# Patient Record
Sex: FEMALE | Race: WHITE | NOT HISPANIC OR LATINO
[De-identification: names, ages, dates, MRNs, and addresses within clinical notes are randomized per-mention and may not be internally consistent; named-entity substitution may affect disease eponyms.]

---

## 2019-11-14 ENCOUNTER — APPOINTMENT (OUTPATIENT)
Dept: MAMMOGRAPHY | Facility: CLINIC | Age: 74
End: 2019-11-14
Payer: MEDICARE

## 2019-11-14 ENCOUNTER — OUTPATIENT (OUTPATIENT)
Dept: OUTPATIENT SERVICES | Facility: HOSPITAL | Age: 74
LOS: 1 days | End: 2019-11-14
Payer: MEDICARE

## 2019-11-14 DIAGNOSIS — Z00.8 ENCOUNTER FOR OTHER GENERAL EXAMINATION: ICD-10-CM

## 2019-11-14 PROCEDURE — 77063 BREAST TOMOSYNTHESIS BI: CPT | Mod: 26

## 2019-11-14 PROCEDURE — 77067 SCR MAMMO BI INCL CAD: CPT

## 2019-11-14 PROCEDURE — 77063 BREAST TOMOSYNTHESIS BI: CPT

## 2019-11-14 PROCEDURE — 77067 SCR MAMMO BI INCL CAD: CPT | Mod: 26

## 2021-09-27 ENCOUNTER — APPOINTMENT (OUTPATIENT)
Dept: VASCULAR SURGERY | Facility: CLINIC | Age: 76
End: 2021-09-27
Payer: MEDICARE

## 2021-09-27 VITALS — WEIGHT: 200 LBS | TEMPERATURE: 97.1 F | BODY MASS INDEX: 35.44 KG/M2 | HEIGHT: 63 IN

## 2021-09-27 VITALS — HEART RATE: 67 BPM | SYSTOLIC BLOOD PRESSURE: 120 MMHG | DIASTOLIC BLOOD PRESSURE: 75 MMHG

## 2021-09-27 PROCEDURE — 99203 OFFICE O/P NEW LOW 30 MIN: CPT

## 2021-09-27 PROCEDURE — 93931 UPPER EXTREMITY STUDY: CPT

## 2021-09-27 NOTE — PHYSICAL EXAM
[de-identified] : On physical examination the patient is in no acute distress and neurologically intact. The lungs are clear to auscultation and the heart has a regular rate and rhythm. Abdomen is benign. Right brachial and radial pulses are non-palpable. Left sided pulses are palpable.

## 2021-09-27 NOTE — ASSESSMENT
[FreeTextEntry1] : In summary, Mrs. Fonsecaca p/w incidental finding of innominate artery lesion. We will check her renal function and obtain a CTA to better assess the lesion. A right upper extremity arterial duplex performed in the office today was consistent with innominate lesion and decreased flow throughout right upper extremity. I instructed her to take Aspirin 81mg daily.

## 2021-09-27 NOTE — HISTORY OF PRESENT ILLNESS
[FreeTextEntry1] : I have just had the pleasure of seeing Mrs. 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 \par \par Mrs. Rosario is a pleasant 76-year-old lady who reported a ringing sensation in her ears. As port of her workup, a carotid duplex was performed which was concerning for a lesion proximal to the right carotid artery (no other significant findings were noted). She underwent MRA which showed innominate stenosis vs. artifact and she was referred for additional evaluation. She has a history of right shoulder injury and right wrist fracture necessitating surgery. Her range of motion is limited due to these prior injuries and she denies any symptoms consistent with claudication or rest pain. She reports that her blood pressure is typically measured on the left side. She denies any paresthesia, weakness or skin discoloration of the right upper extremity. She denies any history of TIA or CVA and denies any focal neurologic deficits including amaurosis fugax, facial droop, difficulty with speech or extremity weakness (except as described). She denies any history of CAD, MI, arrhythmia, DM or CRI.\par \par Her medical history is significant for HTN, HLD, appendectomy, bladder cancer s/p BCG\par \par Medications: Enalapril, Prilosec, and Zetia\par \par Allergies: NKDA\par \par FH: NC\par \par SH: Smoked since teenage years. Quit 13 years ago. Smoked 1ppd at most.\par \par

## 2021-09-30 ENCOUNTER — APPOINTMENT (OUTPATIENT)
Dept: CT IMAGING | Facility: CLINIC | Age: 76
End: 2021-09-30
Payer: MEDICARE

## 2021-09-30 ENCOUNTER — OUTPATIENT (OUTPATIENT)
Dept: OUTPATIENT SERVICES | Facility: HOSPITAL | Age: 76
LOS: 1 days | End: 2021-09-30
Payer: MEDICARE

## 2021-09-30 ENCOUNTER — RESULT REVIEW (OUTPATIENT)
Age: 76
End: 2021-09-30

## 2021-09-30 DIAGNOSIS — Z00.8 ENCOUNTER FOR OTHER GENERAL EXAMINATION: ICD-10-CM

## 2021-09-30 PROCEDURE — 70498 CT ANGIOGRAPHY NECK: CPT | Mod: 26,MH

## 2021-09-30 PROCEDURE — 71275 CT ANGIOGRAPHY CHEST: CPT | Mod: 26,MH

## 2021-09-30 PROCEDURE — 70498 CT ANGIOGRAPHY NECK: CPT | Mod: MH

## 2021-09-30 PROCEDURE — 71275 CT ANGIOGRAPHY CHEST: CPT | Mod: MH

## 2021-10-07 ENCOUNTER — APPOINTMENT (OUTPATIENT)
Dept: VASCULAR SURGERY | Facility: CLINIC | Age: 76
End: 2021-10-07
Payer: MEDICARE

## 2021-10-07 PROCEDURE — 99212 OFFICE O/P EST SF 10 MIN: CPT

## 2021-10-07 NOTE — ASSESSMENT
[FreeTextEntry1] : As part of her evaluation, Mrs. Rosario underwent CTA of the chest and neck. This showed a high grade stenosis with heavy calcification of the innominate artery. Additionally, findings were as follows: intracerebral aneurysms (7.4mm left anterior communicating artery and 2.7mm left MCA bifurcation); izabel-pancreatic fat stranding (partially imaged) suspicious for pancreatitis.\par \par The above results were discussed with  and Mr. Rosario. \par \par For her innominate stenosis, we will continue conservative management. She will follow up in six months. Aspirin 81mg should be started following neurosurgical evaluation. She should be placed on a statin if there is no contraindication.\par \par I have referred her to Dr. Jer Ortiz for Neurosurgical evaluation. \par \par I have sent over the CT findings reports to her PCP to follow up on pancreatitis.

## 2021-10-07 NOTE — REASON FOR VISIT
[Home] : at home, [unfilled] , at the time of the visit. [Medical Office: ([facility])___] : at the medical office located in  [Verbal consent obtained from patient] : the patient, [unfilled]

## 2022-04-07 ENCOUNTER — APPOINTMENT (OUTPATIENT)
Dept: NEUROSURGERY | Facility: CLINIC | Age: 77
End: 2022-04-07
Payer: MEDICARE

## 2022-04-07 DIAGNOSIS — Z86.39 PERSONAL HISTORY OF OTHER ENDOCRINE, NUTRITIONAL AND METABOLIC DISEASE: ICD-10-CM

## 2022-04-07 DIAGNOSIS — Z86.79 PERSONAL HISTORY OF OTHER DISEASES OF THE CIRCULATORY SYSTEM: ICD-10-CM

## 2022-04-07 PROCEDURE — 99203 OFFICE O/P NEW LOW 30 MIN: CPT

## 2022-04-08 NOTE — HISTORY OF PRESENT ILLNESS
[de-identified] : Yoseph Rosario is a 76yr old female here for a new patient visit. 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 Gage via endovascular coiling acomm aneurysm.  Today she presents feeling well denies any motor sensory speech visual abnormalities. Had recent MRA Brain done at [facility] which shed like to review with us for second opinion. Has history  of right side ear whooshing which prompted imaging with CTA and ultrasound  brachiocephalic blockages. Mother has history of cerebral aneurysm which ruptured when she was 80. \par \par history of bladder cancer treated by Dr. Tucker

## 2022-04-08 NOTE — ASSESSMENT
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 Salas via endovascular coiling acomm aneurysm. \par  Had recent MRA Brain done at [facility] showing flow related enhancement in regio of previously treated acomm aneurysm \par Plan:\par Recommend she mail or bring in CDs of angiogram done at [facility] then will discuss plan of care moving forward

## 2022-06-30 ENCOUNTER — APPOINTMENT (OUTPATIENT)
Dept: NEUROSURGERY | Facility: CLINIC | Age: 77
End: 2022-06-30

## 2022-06-30 VITALS
HEIGHT: 63 IN | SYSTOLIC BLOOD PRESSURE: 134 MMHG | DIASTOLIC BLOOD PRESSURE: 77 MMHG | BODY MASS INDEX: 35.44 KG/M2 | WEIGHT: 200 LBS | TEMPERATURE: 97.3 F | OXYGEN SATURATION: 96 % | HEART RATE: 75 BPM

## 2022-06-30 PROCEDURE — 99215 OFFICE O/P EST HI 40 MIN: CPT

## 2022-06-30 NOTE — PHYSICAL EXAM
[General Appearance - Alert] : alert [General Appearance - In No Acute Distress] : in no acute distress [Person] : oriented to person [Place] : oriented to place [Time] : oriented to time [Motor Strength] : muscle strength was normal in all four extremities

## 2022-07-01 NOTE — ASSESSMENT
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 Salas via endovascular coiling acomm aneurysm. 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 The risks, benefits, alternative, complications and personnel associated with the procedure were discussed with the patient and family in great detail.  They request that we proceed. 8/30/2022\par

## 2022-07-01 NOTE — HISTORY OF PRESENT ILLNESS
[FreeTextEntry1] : Yoseph Rosario is a 76yr old female here for a new patient visit. 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 Gage via endovascular coiling acomm aneurysm. Today she presents feeling well denies any motor sensory speech visual abnormalities. Had recent MRA Brain done at [facility] which shed like to review with us for second opinion. Has history of right side ear whooshing which prompted imaging with CTA and ultrasound brachiocephalic blockages. Mother has history of cerebral aneurysm which ruptured when she was 80. \par \par history of bladder cancer treated by Dr. Tucker \par

## 2022-07-01 NOTE — REASON FOR VISIT
[Follow-Up: _____] : a [unfilled] follow-up visit [Family Member] : family member [Spouse] : spouse [FreeTextEntry1] : Zuleyma Rosario presents today with CD of cerebral angiogram done at [facility] for us to review. Today she feels well denies any motor sensory speech visual abnormalities. Brother had aneurysm repaired, mother had aneurysm rupture. States he has right brachiocephalic issues, no blood pressure in right arm weak to no pulse in right wrist. States Dr. Gage treated the aneurysm through left groin.

## 2022-07-06 ENCOUNTER — APPOINTMENT (OUTPATIENT)
Dept: ORTHOPEDIC SURGERY | Facility: CLINIC | Age: 77
End: 2022-07-06

## 2022-07-06 VITALS — BODY MASS INDEX: 35.44 KG/M2 | WEIGHT: 200 LBS | HEIGHT: 63 IN

## 2022-07-06 DIAGNOSIS — M23.92 UNSPECIFIED INTERNAL DERANGEMENT OF LEFT KNEE: ICD-10-CM

## 2022-07-06 DIAGNOSIS — S83.282A OTHER TEAR OF LATERAL MENISCUS, CURRENT INJURY, LEFT KNEE, INITIAL ENCOUNTER: ICD-10-CM

## 2022-07-06 DIAGNOSIS — M17.12 UNILATERAL PRIMARY OSTEOARTHRITIS, LEFT KNEE: ICD-10-CM

## 2022-07-06 DIAGNOSIS — S83.242A OTHER TEAR OF MEDIAL MENISCUS, CURRENT INJURY, LEFT KNEE, INITIAL ENCOUNTER: ICD-10-CM

## 2022-07-06 PROCEDURE — J3490M: CUSTOM

## 2022-07-06 PROCEDURE — 99214 OFFICE O/P EST MOD 30 MIN: CPT | Mod: 25

## 2022-07-06 PROCEDURE — 20610 DRAIN/INJ JOINT/BURSA W/O US: CPT

## 2022-07-06 NOTE — PHYSICAL EXAM
[Left] : left knee [NL (0)] : extension 0 degrees [5___] : hamstring 5[unfilled]/5 [Positive] : positive Vicki [] : patient ambulates without assistive device [TWNoteComboBox7] : flexion 120 degrees

## 2022-07-06 NOTE — PROCEDURE
[FreeTextEntry3] : Procedure Name: Large Joint Injection / Aspiration: Celestone, Lidocaine and Marcaine \par \par Large Joint Injection was performed because of pain and inflammation. Anesthesia: ethyl chloride sprayed topically.. \par Celestone: An injection of Celestone 6 mg , 1 cc. \par Lidocaine 1%: 3 cc.\par Marcaine 0.25%:  3 cc.\par \par Patient has tried OTC's including aspirin, Ibuprofen, Aleve etc or prescription NSAIDS, and/or exercises at home and/ or physical therapy without satisfactory response and Patient has decreased mobility in the joint. The risks, benefits, and alternatives to cortisone injection were explained in full to the patient. Risks outlined include but are not limited to infection, sepsis, bleeding, scarring, skin discoloration, temporary increase in pain, syncopal episode, failure to resolve symptoms, allergic reaction, symptom recurrence, and elevation of blood sugar in diabetics. Patient understood the risks. All questions were answered.   Oral informed consent was obtained.   Sterile technique was utilized for the procedure including the preparation of the solutions used for the injection. Medication was injected in the LEFT KNEE.   Patient tolerated the procedure well. Advised to ice the injection site this evening.  Post Procedure Instructions: Patient was advised to call if redness, pain, or fever occur and apply ice for 15 min. out of every hour for the next 12-24 hours as tolerated. patient was advised to rest the joint(s) for 2 days. \par \par

## 2022-07-06 NOTE — ASSESSMENT
[FreeTextEntry1] : left knee pain for years but worse since end of june 2022.  no new injury or trauma.  no fevers or chills.  mri 2022 with mmt, lmt, moderate to severe oa. \par \par history of brain aneurysm.

## 2022-07-06 NOTE — HISTORY OF PRESENT ILLNESS
[6] : 6 [4] : 4 [de-identified] : 7/6/22:  left knee pain for years but worse since end of june 2022.  no new injury or trauma.  pain worse with walking, stairs, bending.  reports to tightness and stiffness.  occ clicking.  no buckling.  rest, ice, otc meds prn with little relief.  she went to an  and then an mri was ordered.  she is here with results.  [] : no [FreeTextEntry1] : left knee [FreeTextEntry5] : No known injury. [FreeTextEntry6] : pulling [de-identified] : mri @ [facility]

## 2022-07-06 NOTE — DISCUSSION/SUMMARY
[de-identified] : Instructions:  Progress Note completed by Lisa Poe PA-C\par * Dr. Back -- The documentation recorded accurately reflects the decisions made by me during this visit.

## 2022-07-06 NOTE — DATA REVIEWED
[MRI] : MRI [Left] : left [Knee] : knee [Report was reviewed and noted in the chart] : The report was reviewed and noted in the chart [I reviewed the films/CD] : I reviewed the films/CD

## 2022-08-25 ENCOUNTER — OUTPATIENT (OUTPATIENT)
Dept: OUTPATIENT SERVICES | Facility: HOSPITAL | Age: 77
LOS: 1 days | End: 2022-08-25
Payer: MEDICARE

## 2022-08-25 VITALS
SYSTOLIC BLOOD PRESSURE: 180 MMHG | TEMPERATURE: 99 F | DIASTOLIC BLOOD PRESSURE: 90 MMHG | OXYGEN SATURATION: 97 % | RESPIRATION RATE: 18 BRPM | HEIGHT: 61 IN | WEIGHT: 197.09 LBS | HEART RATE: 100 BPM

## 2022-08-25 DIAGNOSIS — C67.9 MALIGNANT NEOPLASM OF BLADDER, UNSPECIFIED: Chronic | ICD-10-CM

## 2022-08-25 DIAGNOSIS — Z98.890 OTHER SPECIFIED POSTPROCEDURAL STATES: Chronic | ICD-10-CM

## 2022-08-25 DIAGNOSIS — I67.1 CEREBRAL ANEURYSM, NONRUPTURED: ICD-10-CM

## 2022-08-25 DIAGNOSIS — Z01.818 ENCOUNTER FOR OTHER PREPROCEDURAL EXAMINATION: ICD-10-CM

## 2022-08-25 LAB
ANION GAP SERPL CALC-SCNC: 12 MMOL/L — SIGNIFICANT CHANGE UP (ref 5–17)
BLD GP AB SCN SERPL QL: NEGATIVE — SIGNIFICANT CHANGE UP
BUN SERPL-MCNC: 17 MG/DL — SIGNIFICANT CHANGE UP (ref 7–23)
CALCIUM SERPL-MCNC: 9.8 MG/DL — SIGNIFICANT CHANGE UP (ref 8.4–10.5)
CHLORIDE SERPL-SCNC: 104 MMOL/L — SIGNIFICANT CHANGE UP (ref 96–108)
CO2 SERPL-SCNC: 23 MMOL/L — SIGNIFICANT CHANGE UP (ref 22–31)
CREAT SERPL-MCNC: 0.81 MG/DL — SIGNIFICANT CHANGE UP (ref 0.5–1.3)
EGFR: 75 ML/MIN/1.73M2 — SIGNIFICANT CHANGE UP
GLUCOSE SERPL-MCNC: 106 MG/DL — HIGH (ref 70–99)
HCT VFR BLD CALC: 39.4 % — SIGNIFICANT CHANGE UP (ref 34.5–45)
HGB BLD-MCNC: 12.7 G/DL — SIGNIFICANT CHANGE UP (ref 11.5–15.5)
MCHC RBC-ENTMCNC: 29.5 PG — SIGNIFICANT CHANGE UP (ref 27–34)
MCHC RBC-ENTMCNC: 32.2 GM/DL — SIGNIFICANT CHANGE UP (ref 32–36)
MCV RBC AUTO: 91.4 FL — SIGNIFICANT CHANGE UP (ref 80–100)
NRBC # BLD: 0 /100 WBCS — SIGNIFICANT CHANGE UP (ref 0–0)
PLATELET # BLD AUTO: 264 K/UL — SIGNIFICANT CHANGE UP (ref 150–400)
POTASSIUM SERPL-MCNC: 4 MMOL/L — SIGNIFICANT CHANGE UP (ref 3.5–5.3)
POTASSIUM SERPL-SCNC: 4 MMOL/L — SIGNIFICANT CHANGE UP (ref 3.5–5.3)
RBC # BLD: 4.31 M/UL — SIGNIFICANT CHANGE UP (ref 3.8–5.2)
RBC # FLD: 13.2 % — SIGNIFICANT CHANGE UP (ref 10.3–14.5)
RH IG SCN BLD-IMP: NEGATIVE — SIGNIFICANT CHANGE UP
SODIUM SERPL-SCNC: 139 MMOL/L — SIGNIFICANT CHANGE UP (ref 135–145)
WBC # BLD: 9.06 K/UL — SIGNIFICANT CHANGE UP (ref 3.8–10.5)
WBC # FLD AUTO: 9.06 K/UL — SIGNIFICANT CHANGE UP (ref 3.8–10.5)

## 2022-08-25 PROCEDURE — 86901 BLOOD TYPING SEROLOGIC RH(D): CPT

## 2022-08-25 PROCEDURE — 86850 RBC ANTIBODY SCREEN: CPT

## 2022-08-25 PROCEDURE — 80048 BASIC METABOLIC PNL TOTAL CA: CPT

## 2022-08-25 PROCEDURE — G0463: CPT

## 2022-08-25 PROCEDURE — 86900 BLOOD TYPING SEROLOGIC ABO: CPT

## 2022-08-25 PROCEDURE — 85027 COMPLETE CBC AUTOMATED: CPT

## 2022-08-25 NOTE — H&P PST ADULT - NSICDXPASTMEDICALHX_GEN_ALL_CORE_FT
PAST MEDICAL HISTORY:  Bladder cancer     History of cerebral aneurysm     Hypertension     Stenosis of brachiocephalic artery

## 2022-08-25 NOTE — H&P PST ADULT - PROBLEM SELECTOR PLAN 1
Cerebral angiogram 8/30/22, patient stopped aspirin for 3 days.  Instructed patient to continue aspirin. Aspirin PLT response lab work not performed due to patient stopping for 3 days prior to PST.  Elevated BP discussed with anesthesia, as it is directly related to brachiocephalic stenosis.  Dr. Berg is aware, as per patient and plans to do intervention after cleared from any new cerebral aneurysms

## 2022-08-25 NOTE — H&P PST ADULT - HISTORY OF PRESENT ILLNESS
This is a 77 y/o female PMH tinnitus of the left ear a year ago, was found to have brachiocephalic stenosis and incidental finding of cerebral aneurysm, S/P coiling 12/03/21.  Presents today for follow up cerebral angiogram.    COVID+ with mild symptoms 2020, COVID swab scheduled 8/26/22 This is a 75 y/o female PMH tinnitus of the left ear a year ago, was found to have brachiocephalic stenosis and incidental finding of cerebral aneurysm, S/P coiling 12/03/21.  Presents today for follow up cerebral angiogram.  Of note, patient has had elevated blood pressure as a result of brachiocephalic stenosis.    COVID+ with mild symptoms 2020, COVID swab scheduled 8/26/22

## 2022-08-25 NOTE — H&P PST ADULT - MUSCULOSKELETAL
[facility]  Adult Nutrition  Follow-up Note         Reason for Assessment  Reason For Assessment: RD follow-up  Nutrition Risk Screen: tube feeding or parenteral nutrition, large or nonhealing wound, burn or pressure injury  Malnutrition  Is Patient Malnourished: No  Nutrition Diagnosis  Increased protein Needs   related to Decreased/ impaired skin integrity as evidenced by wounds    Nutrition Diagnosis Status: Improving      Nutrition Risk  Level of Risk/Frequency of Follow-up: high   Chewing or Swallowing Difficulty?: Swallowing difficulty  Estimated/Assessed Needs  RMR ([place]-St. Jeor Equation): 1527.13 Activity Factor: 1 Injury Factor: 1.2   Total Ve: 12 mL Temp: 99.2 °F (37.3 °C)Axillary  Weight Used For Calorie Calculations: 75.3 kg (166 lb 0.1 oz)   Energy Need Method: [facility] Energy Calorie Requirements (kcal): 2105  Weight Used For Protein Calculations: 83.3 kg (183 lb 10.3 oz)  Protein Requirements: 100-125  Estimated Fluid Requirement Method: RDA Method Fluid Requirements (mL): 2105  RDA Method (mL): 2105     Nutrition Prescription / Recommendations  Recommendation/Intervention: PEG tube feeding: Nepro @ 60ml/hr; H20 flush of 50ml q 4hr  Goals: TF tolerance, wound healing, pt will meet estimated nutritional needs  Nutrition Goal Status: progressing towards goal  Communication of RD Recs: reviewed with RN  Current Diet Order: PEG tube feeding: Nepro @ 60ml/hr; H20 flush of 50ml q 4hr  Nutrition Order Comments: PEG tube feeding: Nepro @ 60ml/hr; H20 flush of 100ml q 4hr  Current Nutrition Support Formula Ordered: Nepro  Current Nutrition Support Rate Ordered: 60 (ml)  Current Nutrition Support Frequency Ordered: hourly  Recommended Diet: Enteral Nutrition PEG tube feeding: Nepro @ 60ml/hr; H20 flush of 50ml q 4hr  Recommended Oral Supplement: No Oral Supplements  Is Nutrition Support Recommended: yes  Is Education Recommended: No  Monitor and Evaluation  % current Intake: Enteral  Nutrition at goal  % intake to meet estimated needs: Enteral Nutrition   Food and Nutrient Intake: enteral nutrition intake  Food and Nutrient Adminstration: enteral and parenteral nutrition administration  Anthropometric Measurements: height/length, body mass index, weight, weight change  Biochemical Data, Medical Tests and Procedures: electrolyte and renal panel, glucose/endocrine profile  Nutrition-Focused Physical Findings: skin  Enteral Calories (kcal): 2592  Enteral Protein (gm): 115  Enteral (Free Water) Fluid (mL): 1037  Free Water Flush Fluid (mL): 300  Parenteral Calories (kcal): 845  Parenteral Protein (gm): 48  Parenteral Fluid (mL): 960  Lipid Calories (kcals): 500 kcals  Other Calories (kcal): 528 (propofol)  Total Calories (kcal): 2160  Total Calories (kcal/kg): 2612  % Kcal Needs: 100  Total Protein (gm): 135  % Protein Needs: 100  IV Fluid (mL): 1764  Total Fluid Intake (mL): 1337  Energy Calories Required: meeting needs  Protein Required: meeting needs  Fluid Required: meeting needs  Tolerance: tolerating  Current Medical Diagnosis and Past Medical History  Diagnosis: gastrointestinal disease, infection/sepsis  Past Medical History:   Diagnosis Date    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  Last A1c: No results found for: HGBA1C  Lab Results   Component Value Date    RBC 2.52 (L) 04/08/2022    HGB 7.2 (L) 04/08/2022    HCT 22.8 (L) 04/08/2022    MCV 90.5 04/08/2022    MCH 28.6 04/08/2022    MCHC 31.6 (L) 04/08/2022     Pertinent Labs Reviewed: reviewed  Pertinent Labs Comments: Hct  "22.8, Na 133, BUN 97, BUN 97, Cr 2.59, Alb 1.3  Pertinent Medications Reviewed: reviewed  Pertinent Medications Comments: heparin, insulin  Anthropometrics  Temp: 99.2 °F (37.3 °C)  Height Method: Stated  Height: 5' 11" (180.3 cm)  Height (inches): 71 in  Weight Method: Bed Scale  Weight: 72.5 kg (159 lb 13.3 oz)  Weight (lb): 159.84 lb  Ideal Body Weight (IBW), Male: 172 lb  % Ideal Body Weight, Male (lb): 106.77 %  BMI (Calculated): 22.3  BMI Grade: 25 - 29.9 - overweight     Nutrition by Nursing  Diet/Nutrition Received: tube feeding  Intake (%): 100%  Diet/Feeding Assistance: total feed  Diet/Feeding Tolerance: good  Last Bowel Movement: 04/08/22  [REMOVED]      NG/OG Tube [facility] sump 18 Fr. Left nostril-Feeding Type: continuous, by pump       Gastrostomy/Enterostomy 03/09/22 1436 Percutaneous endoscopic gastrostomy (PEG) midline feeding-Feeding Type: continuous, by pump  [REMOVED]      NG/OG Tube [facility] sump 18 Fr. Left nostril-Current Rate (mL/hr): 50 mL/hr       Gastrostomy/Enterostomy 03/09/22 1436 Percutaneous endoscopic gastrostomy (PEG) midline feeding-Current Rate (mL/hr): 60 mL/hr  [REMOVED]      NG/OG Tube [facility] sump 18 Fr. Left nostril-Goal Rate (mL/hr): 75 mL/hr       Gastrostomy/Enterostomy 03/09/22 1436 Percutaneous endoscopic gastrostomy (PEG) midline feeding-Goal Rate (mL/hr): 120 mL/hr  [REMOVED]      NG/OG Tube [facility] sump 18 Fr. Left nostril-Formula Name: nepro 1.8       Gastrostomy/Enterostomy 03/09/22 1436 Percutaneous endoscopic gastrostomy (PEG) midline feeding-Formula Name: Nepro  Nutrition Follow-Up  RD Follow-up?: Yes  Assessment and Plan  No new Assessment & Plan notes have been filed under this hospital service since the last note was generated.  Service: Nutrition     " normal/ROM intact/normal gait/strength 5/5 bilateral upper extremities/strength 5/5 bilateral lower extremities negative

## 2022-08-25 NOTE — H&P PST ADULT - NSANTHOSAYNRD_GEN_A_CORE
No. DARCY screening performed.  STOP BANG Legend: 0-2 = LOW Risk; 3-4 = INTERMEDIATE Risk; 5-8 = HIGH Risk

## 2022-08-25 NOTE — H&P PST ADULT - NSICDXPASTSURGICALHX_GEN_ALL_CORE_FT
PAST SURGICAL HISTORY:  Bladder cancer S/P TURBT    H/O cerebral aneurysm repair coiling 12/2021    S/P ORIF (open reduction internal fixation) fracture

## 2022-08-26 ENCOUNTER — TRANSCRIPTION ENCOUNTER (OUTPATIENT)
Age: 77
End: 2022-08-26

## 2022-08-26 ENCOUNTER — OUTPATIENT (OUTPATIENT)
Dept: OUTPATIENT SERVICES | Facility: HOSPITAL | Age: 77
LOS: 1 days | End: 2022-08-26
Payer: MEDICARE

## 2022-08-26 DIAGNOSIS — C67.9 MALIGNANT NEOPLASM OF BLADDER, UNSPECIFIED: Chronic | ICD-10-CM

## 2022-08-26 DIAGNOSIS — Z11.52 ENCOUNTER FOR SCREENING FOR COVID-19: ICD-10-CM

## 2022-08-26 DIAGNOSIS — Z98.890 OTHER SPECIFIED POSTPROCEDURAL STATES: Chronic | ICD-10-CM

## 2022-08-26 LAB — SARS-COV-2 RNA SPEC QL NAA+PROBE: SIGNIFICANT CHANGE UP

## 2022-08-26 PROCEDURE — U0005: CPT

## 2022-08-26 PROCEDURE — C9803: CPT

## 2022-08-26 PROCEDURE — U0003: CPT

## 2022-08-30 ENCOUNTER — RESULT REVIEW (OUTPATIENT)
Age: 77
End: 2022-08-30

## 2022-08-30 ENCOUNTER — OUTPATIENT (OUTPATIENT)
Dept: OUTPATIENT SERVICES | Facility: HOSPITAL | Age: 77
LOS: 1 days | End: 2022-08-30
Payer: MEDICARE

## 2022-08-30 ENCOUNTER — TRANSCRIPTION ENCOUNTER (OUTPATIENT)
Age: 77
End: 2022-08-30

## 2022-08-30 ENCOUNTER — APPOINTMENT (OUTPATIENT)
Dept: NEUROSURGERY | Facility: HOSPITAL | Age: 77
End: 2022-08-30

## 2022-08-30 VITALS
RESPIRATION RATE: 18 BRPM | DIASTOLIC BLOOD PRESSURE: 90 MMHG | SYSTOLIC BLOOD PRESSURE: 173 MMHG | OXYGEN SATURATION: 95 % | HEART RATE: 70 BPM

## 2022-08-30 VITALS
DIASTOLIC BLOOD PRESSURE: 96 MMHG | SYSTOLIC BLOOD PRESSURE: 221 MMHG | HEIGHT: 62 IN | WEIGHT: 199.96 LBS | TEMPERATURE: 99 F | HEART RATE: 98 BPM | RESPIRATION RATE: 20 BRPM | OXYGEN SATURATION: 100 %

## 2022-08-30 DIAGNOSIS — Z98.890 OTHER SPECIFIED POSTPROCEDURAL STATES: Chronic | ICD-10-CM

## 2022-08-30 DIAGNOSIS — I67.1 CEREBRAL ANEURYSM, NONRUPTURED: ICD-10-CM

## 2022-08-30 DIAGNOSIS — C67.9 MALIGNANT NEOPLASM OF BLADDER, UNSPECIFIED: Chronic | ICD-10-CM

## 2022-08-30 PROCEDURE — C1894: CPT

## 2022-08-30 PROCEDURE — 36227 PLACE CATH XTRNL CAROTID: CPT | Mod: LT

## 2022-08-30 PROCEDURE — C1760: CPT

## 2022-08-30 PROCEDURE — 36224 PLACE CATH CAROTD ART: CPT | Mod: LT

## 2022-08-30 PROCEDURE — 36226 PLACE CATH VERTEBRAL ART: CPT | Mod: LT

## 2022-08-30 PROCEDURE — 36227 PLACE CATH XTRNL CAROTID: CPT

## 2022-08-30 PROCEDURE — 76377 3D RENDER W/INTRP POSTPROCES: CPT | Mod: 26

## 2022-08-30 PROCEDURE — 36224 PLACE CATH CAROTD ART: CPT

## 2022-08-30 PROCEDURE — C1769: CPT

## 2022-08-30 PROCEDURE — 36226 PLACE CATH VERTEBRAL ART: CPT

## 2022-08-30 PROCEDURE — 75630 X-RAY AORTA LEG ARTERIES: CPT | Mod: 26

## 2022-08-30 PROCEDURE — C1887: CPT

## 2022-08-30 RX ORDER — ONDANSETRON 8 MG/1
4 TABLET, FILM COATED ORAL ONCE
Refills: 0 | Status: DISCONTINUED | OUTPATIENT
Start: 2022-08-30 | End: 2022-09-13

## 2022-08-30 NOTE — PRE-ANESTHESIA EVALUATION ADULT - NSANTHPMHFT_GEN_ALL_CORE
75 y/o female PMH tinnitus of the left ear a year ago, was found to have brachiocephalic stenosis and incidental finding of cerebral aneurysm, S/P coiling 12/03/21.  Presents today for follow up cerebral angiogram.  Of note, patient has had elevated blood pressure as a result of brachiocephalic stenosis.      Patient with elevated BP today, however states BP is 140s-150s/80s in PCP and is always elevated prior to procedures.

## 2022-08-30 NOTE — ASU DISCHARGE PLAN (ADULT/PEDIATRIC) - NS MD DC FALL RISK RISK
For information on Fall & Injury Prevention, visit: [email or web address] OR  [email or web address] OR  https://www.cdc.gov/steadi/patient.html

## 2022-08-30 NOTE — ASU PATIENT PROFILE, ADULT - FALL HARM RISK - UNIVERSAL INTERVENTIONS
Bed in lowest position, wheels locked, appropriate side rails in place/Call bell, personal items and telephone in reach/Instruct patient to call for assistance before getting out of bed or chair/Non-slip footwear when patient is out of bed/[place] to call system/Physically safe environment - no spills, clutter or unnecessary equipment/Purposeful Proactive Rounding/Room/bathroom lighting operational, light cord in reach

## 2022-08-30 NOTE — ASU DISCHARGE PLAN (ADULT/PEDIATRIC) - ASU DC SPECIAL INSTRUCTIONSFT
cerebral angiogram completed confirmed right side subclavian steal   acomm aneurysm still filling  lmca and lica terminus aneurysm remain  call dr torres office at  to discuss treatment

## 2022-09-01 PROBLEM — Z86.79 PERSONAL HISTORY OF OTHER DISEASES OF THE CIRCULATORY SYSTEM: Chronic | Status: ACTIVE | Noted: 2022-08-25

## 2022-09-01 PROBLEM — I77.1 STRICTURE OF ARTERY: Chronic | Status: ACTIVE | Noted: 2022-08-25

## 2022-09-01 PROBLEM — I10 ESSENTIAL (PRIMARY) HYPERTENSION: Chronic | Status: ACTIVE | Noted: 2022-08-25

## 2022-09-01 PROBLEM — C67.9 MALIGNANT NEOPLASM OF BLADDER, UNSPECIFIED: Chronic | Status: ACTIVE | Noted: 2022-08-25

## 2022-09-14 ENCOUNTER — APPOINTMENT (OUTPATIENT)
Dept: NEUROSURGERY | Facility: CLINIC | Age: 77
End: 2022-09-14

## 2022-09-15 ENCOUNTER — APPOINTMENT (OUTPATIENT)
Dept: NEUROSURGERY | Facility: CLINIC | Age: 77
End: 2022-09-15

## 2022-09-20 ENCOUNTER — APPOINTMENT (OUTPATIENT)
Dept: NEUROSURGERY | Facility: CLINIC | Age: 77
End: 2022-09-20

## 2022-09-20 PROCEDURE — 99442: CPT | Mod: 95

## 2022-11-11 ENCOUNTER — OUTPATIENT (OUTPATIENT)
Dept: OUTPATIENT SERVICES | Facility: HOSPITAL | Age: 77
LOS: 1 days | End: 2022-11-11
Payer: MEDICARE

## 2022-11-11 ENCOUNTER — OUTPATIENT (OUTPATIENT)
Dept: OUTPATIENT SERVICES | Facility: HOSPITAL | Age: 77
LOS: 1 days | End: 2022-11-11

## 2022-11-11 VITALS
OXYGEN SATURATION: 98 % | WEIGHT: 199.96 LBS | HEART RATE: 83 BPM | SYSTOLIC BLOOD PRESSURE: 168 MMHG | DIASTOLIC BLOOD PRESSURE: 93 MMHG | TEMPERATURE: 97 F | RESPIRATION RATE: 15 BRPM | HEIGHT: 61 IN

## 2022-11-11 DIAGNOSIS — Z98.890 OTHER SPECIFIED POSTPROCEDURAL STATES: Chronic | ICD-10-CM

## 2022-11-11 DIAGNOSIS — Z11.52 ENCOUNTER FOR SCREENING FOR COVID-19: ICD-10-CM

## 2022-11-11 DIAGNOSIS — C67.9 MALIGNANT NEOPLASM OF BLADDER, UNSPECIFIED: Chronic | ICD-10-CM

## 2022-11-11 DIAGNOSIS — I67.1 CEREBRAL ANEURYSM, NONRUPTURED: ICD-10-CM

## 2022-11-11 DIAGNOSIS — Z01.818 ENCOUNTER FOR OTHER PREPROCEDURAL EXAMINATION: ICD-10-CM

## 2022-11-11 DIAGNOSIS — Z90.49 ACQUIRED ABSENCE OF OTHER SPECIFIED PARTS OF DIGESTIVE TRACT: Chronic | ICD-10-CM

## 2022-11-11 LAB
ANION GAP SERPL CALC-SCNC: 14 MMOL/L — SIGNIFICANT CHANGE UP (ref 5–17)
BLD GP AB SCN SERPL QL: NEGATIVE — SIGNIFICANT CHANGE UP
BUN SERPL-MCNC: 23 MG/DL — SIGNIFICANT CHANGE UP (ref 7–23)
CALCIUM SERPL-MCNC: 10 MG/DL — SIGNIFICANT CHANGE UP (ref 8.4–10.5)
CHLORIDE SERPL-SCNC: 103 MMOL/L — SIGNIFICANT CHANGE UP (ref 96–108)
CO2 SERPL-SCNC: 23 MMOL/L — SIGNIFICANT CHANGE UP (ref 22–31)
CREAT SERPL-MCNC: 0.98 MG/DL — SIGNIFICANT CHANGE UP (ref 0.5–1.3)
EGFR: 59 ML/MIN/1.73M2 — LOW
GLUCOSE SERPL-MCNC: 108 MG/DL — HIGH (ref 70–99)
HCT VFR BLD CALC: 38.9 % — SIGNIFICANT CHANGE UP (ref 34.5–45)
HGB BLD-MCNC: 12.6 G/DL — SIGNIFICANT CHANGE UP (ref 11.5–15.5)
MCHC RBC-ENTMCNC: 29.2 PG — SIGNIFICANT CHANGE UP (ref 27–34)
MCHC RBC-ENTMCNC: 32.4 GM/DL — SIGNIFICANT CHANGE UP (ref 32–36)
MCV RBC AUTO: 90 FL — SIGNIFICANT CHANGE UP (ref 80–100)
NRBC # BLD: 0 /100 WBCS — SIGNIFICANT CHANGE UP (ref 0–0)
PLATELET # BLD AUTO: 255 K/UL — SIGNIFICANT CHANGE UP (ref 150–400)
PLATELET RESPONSE ASPIRIN RESULT: 408 ARU — SIGNIFICANT CHANGE UP (ref 350–700)
POTASSIUM SERPL-MCNC: 4.3 MMOL/L — SIGNIFICANT CHANGE UP (ref 3.5–5.3)
POTASSIUM SERPL-SCNC: 4.3 MMOL/L — SIGNIFICANT CHANGE UP (ref 3.5–5.3)
RBC # BLD: 4.32 M/UL — SIGNIFICANT CHANGE UP (ref 3.8–5.2)
RBC # FLD: 12.8 % — SIGNIFICANT CHANGE UP (ref 10.3–14.5)
RH IG SCN BLD-IMP: NEGATIVE — SIGNIFICANT CHANGE UP
SARS-COV-2 RNA SPEC QL NAA+PROBE: SIGNIFICANT CHANGE UP
SODIUM SERPL-SCNC: 140 MMOL/L — SIGNIFICANT CHANGE UP (ref 135–145)
WBC # BLD: 6.72 K/UL — SIGNIFICANT CHANGE UP (ref 3.8–10.5)
WBC # FLD AUTO: 6.72 K/UL — SIGNIFICANT CHANGE UP (ref 3.8–10.5)

## 2022-11-11 PROCEDURE — U0005: CPT

## 2022-11-11 PROCEDURE — 86850 RBC ANTIBODY SCREEN: CPT

## 2022-11-11 PROCEDURE — 85576 BLOOD PLATELET AGGREGATION: CPT

## 2022-11-11 PROCEDURE — 80048 BASIC METABOLIC PNL TOTAL CA: CPT

## 2022-11-11 PROCEDURE — U0003: CPT

## 2022-11-11 PROCEDURE — 36415 COLL VENOUS BLD VENIPUNCTURE: CPT

## 2022-11-11 PROCEDURE — C9803: CPT

## 2022-11-11 PROCEDURE — 86900 BLOOD TYPING SEROLOGIC ABO: CPT

## 2022-11-11 PROCEDURE — 85027 COMPLETE CBC AUTOMATED: CPT

## 2022-11-11 PROCEDURE — 86901 BLOOD TYPING SEROLOGIC RH(D): CPT

## 2022-11-11 PROCEDURE — G0463: CPT

## 2022-11-11 NOTE — H&P PST ADULT - NSICDXPASTMEDICALHX_GEN_ALL_CORE_FT
PAST MEDICAL HISTORY:  Bladder cancer     COVID-19 virus infection -dec 2021 (mild symptoms)    History of cerebral aneurysm     Hypertension     Stenosis of brachiocephalic artery     Torn meniscus -left knee, getting PT

## 2022-11-11 NOTE — H&P PST ADULT - PROBLEM SELECTOR PLAN 1
Brachiocephalic Stenosis  Procedure: Cerebral Angiogram and Embolization  -cbc, bmp, type and screen, ARU drawn at [facility]  -cardiology evaluation 11/3  -preop instructions provided  -instructed to continue low dose aspirin  -instructed to take antihypertensive medications day of procedure with sips of water  -ABO on admit

## 2022-11-11 NOTE — H&P PST ADULT - OTHER CARE PROVIDERS
Cardiologist--Cuong Espinal, [phone] (last visit 11/3) Cardiologist --Cuong Espinal, [phone] (last visit 11/3)

## 2022-11-11 NOTE — H&P PST ADULT - NEGATIVE NEUROLOGICAL SYMPTOMS
no transient paralysis/no weakness/no paresthesias/no generalized seizures/no syncope/no tremors/no vertigo

## 2022-11-11 NOTE — H&P PST ADULT - NEGATIVE ENMT SYMPTOMS
no hearing difficulty/no ear pain/no sinus symptoms/no nasal congestion/no nasal discharge/no nasal obstruction/no abnormal taste sensation/no throat pain/no dysphagia

## 2022-11-11 NOTE — H&P PST ADULT - HISTORY OF PRESENT ILLNESS
77 year old female with PMH of HTN, HLD,     preop covid swab 11/11 @ [facility] 77 year old female with PMH of Bladder Cancer, S/P TURBT, HTN, HLD, Incidentally found ACOMM Aneurysm, S/P  Endovascular Coiling 12/3/2021. 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 She denies any motor sensory speech visual abnormalities. She presents to [facility] today for evaluation prior to scheduled Cerebral Angiogram and Embolization  on 11/15/2022.    of note elevated blood pressure noted at PST as a result of brachiocephalic stenosis.    preop covid swab 11/11 @ [facility]

## 2022-11-11 NOTE — H&P PST ADULT - FALL HARM RISK - UNIVERSAL INTERVENTIONS
Bed in lowest position, wheels locked, appropriate side rails in place/Call bell, personal items and telephone in reach/Instruct patient to call for assistance before getting out of bed or chair/Non-slip footwear when patient is out of bed/[place] to call system/Physically safe environment - no spills, clutter or unnecessary equipment/Purposeful Proactive Rounding/Room/bathroom lighting operational, light cord in reach

## 2022-11-11 NOTE — H&P PST ADULT - NEUROLOGICAL
sensation intact/responds to pain/responds to verbal commands/cranial nerves intact/no spontaneous movement details…

## 2022-11-11 NOTE — H&P PST ADULT - NSICDXPASTSURGICALHX_GEN_ALL_CORE_FT
PAST SURGICAL HISTORY:  Bladder cancer -S/P TURBT 15 years ago    H/O cerebral aneurysm repair coiling 12/2021    H/O colonoscopy     S/P appendectomy     S/P coil embolization of cerebral aneurysm -december 2021    S/P ORIF (open reduction internal fixation) fracture -right wrist 2019     PAST SURGICAL HISTORY:  Bladder cancer -S/P TURBT 15 years ago    H/O cerebral aneurysm repair coiling 12/2021    H/O colonoscopy     S/P appendectomy     S/P coil embolization of cerebral aneurysm -december 2021, f/u cerebral angiogram 8/2022    S/P ORIF (open reduction internal fixation) fracture -right wrist 2019

## 2022-11-11 NOTE — H&P PST ADULT - MUSCULOSKELETAL
ROM intact/no joint swelling/no joint erythema/no joint warmth/strength 5/5 bilateral upper extremities/strength 5/5 bilateral lower extremities

## 2022-11-15 ENCOUNTER — APPOINTMENT (OUTPATIENT)
Dept: NEUROSURGERY | Facility: HOSPITAL | Age: 77
End: 2022-11-15

## 2022-11-15 ENCOUNTER — INPATIENT (INPATIENT)
Facility: HOSPITAL | Age: 77
LOS: 1 days | Discharge: HOME | DRG: 26 | End: 2022-11-17
Attending: NEUROLOGICAL SURGERY | Admitting: NEUROLOGICAL SURGERY
Payer: MEDICARE

## 2022-11-15 ENCOUNTER — TRANSCRIPTION ENCOUNTER (OUTPATIENT)
Age: 77
End: 2022-11-15

## 2022-11-15 VITALS
TEMPERATURE: 98 F | HEIGHT: 62 IN | RESPIRATION RATE: 20 BRPM | WEIGHT: 199.96 LBS | DIASTOLIC BLOOD PRESSURE: 93 MMHG | SYSTOLIC BLOOD PRESSURE: 190 MMHG | HEART RATE: 91 BPM | OXYGEN SATURATION: 100 %

## 2022-11-15 DIAGNOSIS — Z98.890 OTHER SPECIFIED POSTPROCEDURAL STATES: Chronic | ICD-10-CM

## 2022-11-15 DIAGNOSIS — C67.9 MALIGNANT NEOPLASM OF BLADDER, UNSPECIFIED: Chronic | ICD-10-CM

## 2022-11-15 DIAGNOSIS — Z90.49 ACQUIRED ABSENCE OF OTHER SPECIFIED PARTS OF DIGESTIVE TRACT: Chronic | ICD-10-CM

## 2022-11-15 DIAGNOSIS — I67.1 CEREBRAL ANEURYSM, NONRUPTURED: ICD-10-CM

## 2022-11-15 LAB
HCT VFR BLD CALC: 28.8 % — LOW (ref 34.5–45)
HGB BLD-MCNC: 9.5 G/DL — LOW (ref 11.5–15.5)
MCHC RBC-ENTMCNC: 29.6 PG — SIGNIFICANT CHANGE UP (ref 27–34)
MCHC RBC-ENTMCNC: 33 GM/DL — SIGNIFICANT CHANGE UP (ref 32–36)
MCV RBC AUTO: 89.7 FL — SIGNIFICANT CHANGE UP (ref 80–100)
NRBC # BLD: 0 /100 WBCS — SIGNIFICANT CHANGE UP (ref 0–0)
PA ADP PRP-ACNC: 123 PRU — LOW (ref 194–417)
PLATELET # BLD AUTO: 183 K/UL — SIGNIFICANT CHANGE UP (ref 150–400)
PLATELET RESPONSE ASPIRIN RESULT: 368 ARU — SIGNIFICANT CHANGE UP (ref 350–700)
RBC # BLD: 3.21 M/UL — LOW (ref 3.8–5.2)
RBC # FLD: 12.7 % — SIGNIFICANT CHANGE UP (ref 10.3–14.5)
WBC # BLD: 8.55 K/UL — SIGNIFICANT CHANGE UP (ref 3.8–10.5)
WBC # FLD AUTO: 8.55 K/UL — SIGNIFICANT CHANGE UP (ref 3.8–10.5)

## 2022-11-15 PROCEDURE — 99233 SBSQ HOSP IP/OBS HIGH 50: CPT

## 2022-11-15 PROCEDURE — 75898 FOLLOW-UP ANGIOGRAPHY: CPT | Mod: 26

## 2022-11-15 PROCEDURE — 36224 PLACE CATH CAROTD ART: CPT | Mod: 59,LT

## 2022-11-15 PROCEDURE — 93010 ELECTROCARDIOGRAM REPORT: CPT

## 2022-11-15 PROCEDURE — 75894 X-RAYS TRANSCATH THERAPY: CPT | Mod: 26

## 2022-11-15 PROCEDURE — 37218 STENT PLACEMT ANTE CAROTID: CPT | Mod: RT

## 2022-11-15 PROCEDURE — 70496 CT ANGIOGRAPHY HEAD: CPT | Mod: 26,MH

## 2022-11-15 PROCEDURE — 61624 TCAT PERM OCCLS/EMBOLJ CNS: CPT

## 2022-11-15 RX ORDER — ACETAMINOPHEN 500 MG
1000 TABLET ORAL ONCE
Refills: 0 | Status: COMPLETED | OUTPATIENT
Start: 2022-11-15 | End: 2022-11-15

## 2022-11-15 RX ORDER — ALPRAZOLAM 0.25 MG
0.25 TABLET ORAL ONCE
Refills: 0 | Status: DISCONTINUED | OUTPATIENT
Start: 2022-11-15 | End: 2022-11-16

## 2022-11-15 RX ORDER — CANGRELOR 50 MG/1
2 INJECTION, POWDER, LYOPHILIZED, FOR SOLUTION INTRAVENOUS
Qty: 50 | Refills: 0 | Status: DISCONTINUED | OUTPATIENT
Start: 2022-11-15 | End: 2022-11-17

## 2022-11-15 RX ORDER — CLOPIDOGREL BISULFATE 75 MG/1
600 TABLET, FILM COATED ORAL ONCE
Refills: 0 | Status: COMPLETED | OUTPATIENT
Start: 2022-11-15 | End: 2022-11-15

## 2022-11-15 RX ORDER — CLOPIDOGREL BISULFATE 75 MG/1
75 TABLET, FILM COATED ORAL
Refills: 0 | Status: DISCONTINUED | OUTPATIENT
Start: 2022-11-16 | End: 2022-11-17

## 2022-11-15 RX ORDER — SODIUM CHLORIDE 9 MG/ML
500 INJECTION INTRAMUSCULAR; INTRAVENOUS; SUBCUTANEOUS ONCE
Refills: 0 | Status: COMPLETED | OUTPATIENT
Start: 2022-11-15 | End: 2022-11-15

## 2022-11-15 RX ORDER — SIMVASTATIN 20 MG/1
5 TABLET, FILM COATED ORAL AT BEDTIME
Refills: 0 | Status: DISCONTINUED | OUTPATIENT
Start: 2022-11-15 | End: 2022-11-15

## 2022-11-15 RX ORDER — ASPIRIN/CALCIUM CARB/MAGNESIUM 324 MG
325 TABLET ORAL
Refills: 0 | Status: DISCONTINUED | OUTPATIENT
Start: 2022-11-16 | End: 2022-11-17

## 2022-11-15 RX ORDER — SODIUM CHLORIDE 9 MG/ML
1000 INJECTION INTRAMUSCULAR; INTRAVENOUS; SUBCUTANEOUS
Refills: 0 | Status: DISCONTINUED | OUTPATIENT
Start: 2022-11-15 | End: 2022-11-15

## 2022-11-15 RX ORDER — SENNA PLUS 8.6 MG/1
2 TABLET ORAL AT BEDTIME
Refills: 0 | Status: DISCONTINUED | OUTPATIENT
Start: 2022-11-15 | End: 2022-11-15

## 2022-11-15 RX ORDER — ACETAMINOPHEN 500 MG
650 TABLET ORAL EVERY 6 HOURS
Refills: 0 | Status: DISCONTINUED | OUTPATIENT
Start: 2022-11-15 | End: 2022-11-17

## 2022-11-15 RX ADMIN — Medication 10 MILLIGRAM(S): at 21:42

## 2022-11-15 RX ADMIN — Medication 1000 MILLIGRAM(S): at 15:45

## 2022-11-15 RX ADMIN — Medication 400 MILLIGRAM(S): at 15:30

## 2022-11-15 RX ADMIN — SODIUM CHLORIDE 1000 MILLILITER(S): 9 INJECTION INTRAMUSCULAR; INTRAVENOUS; SUBCUTANEOUS at 17:56

## 2022-11-15 RX ADMIN — CANGRELOR 54.4 MICROGRAM(S)/KG/MIN: 50 INJECTION, POWDER, LYOPHILIZED, FOR SOLUTION INTRAVENOUS at 22:18

## 2022-11-15 RX ADMIN — CLOPIDOGREL BISULFATE 600 MILLIGRAM(S): 75 TABLET, FILM COATED ORAL at 10:41

## 2022-11-15 RX ADMIN — CANGRELOR 54.4 MICROGRAM(S)/KG/MIN: 50 INJECTION, POWDER, LYOPHILIZED, FOR SOLUTION INTRAVENOUS at 21:37

## 2022-11-15 RX ADMIN — Medication 650 MILLIGRAM(S): at 22:30

## 2022-11-15 RX ADMIN — CANGRELOR 54.4 MICROGRAM(S)/KG/MIN: 50 INJECTION, POWDER, LYOPHILIZED, FOR SOLUTION INTRAVENOUS at 17:54

## 2022-11-15 RX ADMIN — Medication 650 MILLIGRAM(S): at 23:30

## 2022-11-15 RX ADMIN — SODIUM CHLORIDE 70 MILLILITER(S): 9 INJECTION INTRAMUSCULAR; INTRAVENOUS; SUBCUTANEOUS at 21:38

## 2022-11-15 NOTE — PROGRESS NOTE ADULT - SUBJECTIVE AND OBJECTIVE BOX
HPI:  77 year old female with PMH of Bladder Cancer, S/P TURBT, HTN, HLD, Incidentally found ACOMM Aneurysm, S/P  Endovascular Coiling 12/3/2021. 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 She denies any motor sensory speech visual abnormalities. She presents to [facility] today for evaluation prior to scheduled Cerebral Angiogram and Embolization  on 11/15/2022.    of note elevated blood pressure noted at PST as a result of brachiocephalic stenosis.    preop covid swab 11/11 @ [facility] (11 Nov 2022 10:06)          24 HOUR EVENTS:   - POD0 s/p inominate artery angioplasty stenting for subclavian steal and PAUL stenting for residual ACOM aneurysm            ICU Vital Signs Last 24 Hrs  T(C): 36.4 (15 Nov 2022 14:30), Max: 36.8 (15 Nov 2022 09:56)  T(F): 97.6 (15 Nov 2022 14:30), Max: 98.2 (15 Nov 2022 09:56)  HR: 70 (15 Nov 2022 14:30) (70 - 91)  BP: 190/93 (15 Nov 2022 09:56) (190/93 - 190/93)  BP(mean): --  ABP: 101/45 (15 Nov 2022 14:30) (101/45 - 101/45)  ABP(mean): 65 (15 Nov 2022 14:30) (65 - 65)  RR: 15 (15 Nov 2022 14:30) (15 - 20)  SpO2: 98% (15 Nov 2022 14:30) (98% - 100%)       11-15 @ 07:01  -  11-15 @ 14:54  --------------------------------------------------------  IN: 124.4 mL / OUT: 0 mL / NET: 124.4 mL             ABG - ( 15 Nov 2022 13:21 )  pH, Arterial: 7.27  pH, Blood: x     /  pCO2: 46    /  pO2: 191   / HCO3: 21    / Base Excess: -5.6  /  SaO2: 99.5                       MEDICATIONS:  cangrelor Infusion 2 MICROgram(s)/kG/Min IV Continuous <Continuous>  sodium chloride 0.9%. 1000 milliLiter(s) IV Continuous <Continuous>            PHYSICAL EXAM:    General: calm  CVS: RRR  Pulm: CTAB  GI: Soft, NTND  Extremities: No LE Edema, 1+ distal pulses LLE, no groin hematoma  Neuro: AOx3, PERRL, EOMI, facial symmetrical, fluent speech, motor 5/5 throughout, no PND, sensation in tact                 HPI:  77 year old female with PMH of Bladder Cancer, S/P TURBT, HTN, HLD, Incidentally found ACOMM Aneurysm, S/P  Endovascular Coiling 12/3/2021. 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 She denies any motor sensory speech visual abnormalities. She presents to [facility] today for evaluation prior to scheduled Cerebral Angiogram and Embolization  on 11/15/2022.    of note elevated blood pressure noted at PST as a result of brachiocephalic stenosis.    preop covid swab 11/11 @ [facility] (11 Nov 2022 10:06)          24 HOUR EVENTS:   - POD0 s/p inominate artery angioplasty stenting for subclavian steal and PAUL stenting for residual ACOM aneurysm      PAST MEDICAL & SURGICAL HISTORY:  Stenosis of brachiocephalic artery      Hypertension      History of cerebral aneurysm      Bladder cancer      COVID-19 virus infection  -dec 2021 (mild symptoms)      Torn meniscus  -left knee, getting PT      H/O cerebral aneurysm repair  coiling 12/2021      S/P ORIF (open reduction internal fixation) fracture  -right wrist 2019      Bladder cancer  -S/P TURBT 15 years ago      H/O colonoscopy      S/P coil embolization of cerebral aneurysm  -december 2021, f/u cerebral angiogram 8/2022      S/P appendectomy              REVIEW OF SYSTEMS: [ ] Unable to Assess due to neurologic exam   [ x] All ROS addressed below are non-contributory, except:  Neuro: [ ] Headache [ ] Back pain [ ] Numbness [ ] Weakness [ ] Ataxia [ ] Dizziness [ ] Aphasia [ ] Dysarthria [ ] Visual disturbance  Resp: [ ] Shortness of breath/dyspnea, [ ] Orthopnea [ ] Cough  CV: [ ] Chest pain [ ] Palpitation [ ] Lightheadedness [ ] Syncope  Renal: [ ] Thirst [ ] Edema  GI: [ ] Nausea [ ] Emesis [ ] Abdominal pain [ ] Constipation [ ] Diarrhea  Hem: [ ] Hematemesis [ ] bright red blood per rectum  ID: [ ] Fever [ ] Chills [ ] Dysuria  ENT: [ ] Rhinorrhea        T(C): 36.9 (11-16-22 @ 07:00), Max: 37.2 (11-15-22 @ 23:00)  HR: 72 (11-16-22 @ 07:00) (59 - 91)  BP: 141/62 (11-16-22 @ 07:00) (103/50 - 190/93)  RR: 15 (11-16-22 @ 07:00) (12 - 20)  SpO2: 99% (11-16-22 @ 07:00) (95% - 100%)  11-15-22 @ 07:01  -  11-16-22 @ 07:00  --------------------------------------------------------  IN: 2414.8 mL / OUT: 840 mL / NET: 1574.8 mL    11-16-22 @ 07:01  -  11-16-22 @ 09:17  --------------------------------------------------------  IN: 54.4 mL / OUT: 350 mL / NET: -295.6 mL    acetaminophen     Tablet .. 650 milliGRAM(s) Oral every 6 hours PRN  ALPRAZolam 0.25 milliGRAM(s) Oral once PRN  aspirin 325 milliGRAM(s) Oral <User Schedule>  cangrelor Infusion 2 MICROgram(s)/kG/Min IV Continuous <Continuous>  clopidogrel Tablet 75 milliGRAM(s) Oral <User Schedule>  enalapril 10 milliGRAM(s) Oral daily  famotidine    Tablet 20 milliGRAM(s) Oral daily  magnesium sulfate  IVPB 2 Gram(s) IV Intermittent once  multiple electrolytes Injection Type 1 500 milliLiter(s) IV Continuous <Continuous>          PHYSICAL EXAM:    General: calm  CVS: RRR  Pulm: CTAB  GI: Soft, NTND  Extremities: No LE Edema, 1+ distal pulses LLE, no groin hematoma  Neuro: AOx3, PERRL, EOMI, facial symmetrical, fluent speech, motor 5/5 throughout, no PND, sensation in tact

## 2022-11-15 NOTE — CONSULT NOTE ADULT - ASSESSMENT
Brachiocephalic stenosis  s/p stent  fu with nsx    HTN  cont current meds  monitor b/l arm pressure     Aortic stenosis  mod to severe   repeat echo in 6 months      Advanced care planning was discussed with patient and family.  Risks, benefits and alternatives of the cardiac treatments and medical therapy including procedures were discussed in detail and all questions were answered. Importance of compliance with medical therapy and lifestyle modification to improve cardiovascular health were addressed. Appropriate forms and patient educational materials were reviewed. 30 minutes face to face spent.

## 2022-11-15 NOTE — DISCHARGE NOTE NURSING/CASE MANAGEMENT/SOCIAL WORK - PATIENT PORTAL LINK FT
You can access the FollowMyHealth Patient Portal offered by [facility] by registering at the following website: [email or web address]. By joining [employer]’s FollowMyHealth portal, you will also be able to view your health information using other applications (apps) compatible with our system.

## 2022-11-15 NOTE — PATIENT PROFILE ADULT - FALL HARM RISK - UNIVERSAL INTERVENTIONS
Bed in lowest position, wheels locked, appropriate side rails in place/Call bell, personal items and telephone in reach/Instruct patient to call for assistance before getting out of bed or chair/Non-slip footwear when patient is out of bed/[place] to call system/Physically safe environment - no spills, clutter or unnecessary equipment/Purposeful Proactive Rounding/Room/bathroom lighting operational, light cord in reach

## 2022-11-15 NOTE — ASU PREOP CHECKLIST - HEIGHT IN FEET
Suki Wolf  Pharmacy:[facility]  Last refill:01/2021  Last office visit:04/23/2021  Upcoming office visit:07/21/2021
5

## 2022-11-15 NOTE — DISCHARGE NOTE NURSING/CASE MANAGEMENT/SOCIAL WORK - NSDCPEFALRISK_GEN_ALL_CORE
For information on Fall & Injury Prevention, visit: [email or web address] OR  [email or web address] OR  https://www.cdc.gov/steadi/patient.html

## 2022-11-15 NOTE — CHART NOTE - NSCHARTNOTEFT_GEN_A_CORE
Interventional Neuro- Radiology   Procedure Note PA-C    Procedure: Selective Cerebral Angiography and stent was placed in innominate and PAUL X stent in right ICA for treatment of residual ACOM artery aneurysm   Pre- Procedure Diagnosis: ACOM artery aneurysm, previously coiled at an outside hospital   Post- Procedure Diagnosis:     : Dr Meliton Berg  Fellow:    Dr Alphonso Talamantes   Physician Assistant: Kay Jiang PA-C    Nurse:                   Jer Naqvi RN  Radiologic Tech:   Mian Oleary LRT   Anesthesiologist:  Dr Tristin Dobson   Sheath:                 6 [place] destination 85cm in left groin   Sheath:                 6 [place] destination 85 cm sheath right wrist       I/Os: EBL less than 10cc  IV fluids: 1800cc Urine  700cc    Contrast 120cc          Heparin 1,ooo units   Heparin 5,ooo units  [id number]       PAUL X 2.5mm by 18mm by 13mm     RX FlowCoulink Elite     Vitals: /70         HR 82     Spo2 100%          Preliminary Report:  Using a 6 [place] destination 85cm sheath to the left groin and 6 [place] destination 85cm right radial artery under general anesthesia via right vertebral artery, right internal carotid artery a selective cerebral angiography and stent in the innominate and stent in the right ICA for treatment of residual artery aneurysm was performed. Official note to follow.  Patient tolerated procedure well, hemodynamically stable, no change in neurological status compared to baseline. Results discussed with Neuro ICU team, patient and patient's . Left groin sheath was removed, a Vascade and manual compression held to hemostasis for 20 minutes, no active bleeding, by myself. No hematoma, quick clot and safeguard balloon dressing applied at 1400 hours. TR band was placed on right wrist at 1400 hours with 12 cc s of air. Disposition Neuro ICU

## 2022-11-15 NOTE — PROGRESS NOTE ADULT - SUBJECTIVE AND OBJECTIVE BOX
INTERVAL HISTORY: HPI:  77 year old female with PMH of Bladder Cancer, S/P TURBT, HTN, HLD, Incidentally found ACOMM Aneurysm, S/P  Endovascular Coiling 12/3/2021. 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 She denies any motor sensory speech visual abnormalities. She presents to [facility] today for evaluation prior to scheduled Cerebral Angiogram and Embolization  on 11/15/2022.    of note elevated blood pressure noted at PST as a result of brachiocephalic stenosis.    preop covid swab 11/11 @ [facility] (11 Nov 2022 10:06)      MEDICATIONS  (STANDING):  cangrelor Infusion 2 MICROgram(s)/kG/Min (54.4 mL/Hr) IV Continuous <Continuous>  enalapril 10 milliGRAM(s) Oral daily  sodium chloride 0.9%. 1000 milliLiter(s) (70 mL/Hr) IV Continuous <Continuous>    MEDICATIONS  (PRN):  acetaminophen     Tablet .. 650 milliGRAM(s) Oral every 6 hours PRN Temp greater or equal to 38C (100.4F), Mild Pain (1 - 3)  ALPRAZolam 0.25 milliGRAM(s) Oral once PRN MRI, anxiety      Drug Dosing Weight  Height (cm): 157.5 (15 Nov 2022 09:56)  Weight (kg): 90.7 (15 Nov 2022 09:56)  BMI (kg/m2): 36.6 (15 Nov 2022 09:56)  BSA (m2): 1.91 (15 Nov 2022 09:56)    PAST MEDICAL & SURGICAL HISTORY:  Stenosis of brachiocephalic artery  Hypertension  History of cerebral aneurysm  Bladder cancer  COVID-19 virus infection  -dec 2021 (mild symptoms)  Torn meniscus  -left knee, getting PT  H/O cerebral aneurysm repair  coiling 12/2021  S/P ORIF (open reduction internal fixation) fracture  -right wrist 2019  Bladder cancer  -S/P TURBT 15 years ago  H/O colonoscopy  S/P coil embolization of cerebral aneurysm  -december 2021, f/u cerebral angiogram 8/2022  S/P appendectomy          REVIEW OF SYSTEMS: [ ] Unable to Assess due to neurologic exam   [x] All ROS addressed below are non-contributory, except:  Neuro: [ ] Headache [ ] Back pain [ ] Numbness [ ] Weakness [ ] Ataxia [ ] Dizziness [ ] Aphasia [ ] Dysarthria [ ] Visual disturbance  Resp: [ ] Shortness of breath/dyspnea, [ ] Orthopnea [ ] Cough  CV: [ ] Chest pain [ ] Palpitation [ ] Lightheadedness [ ] Syncope  Renal: [ ] Thirst [ ] Edema  GI: [ ] Nausea [ ] Emesis [ ] Abdominal pain [ ] Constipation [ ] Diarrhea  Hem: [ ] Hematemesis [ ] bright red blood per rectum  ID: [ ] Fever [ ] Chills [ ] Dysuria  ENT: [ ] Rhinorrhea    PHYSICAL EXAM:    General: No Acute Distress     Neurological: Awake, alert oriented to person, place and time, Following Commands, PERRL, EOMI, Face Symmetrical, Speech Fluent, Moving all extremities, Muscle Strength normal in all four extremities, No Drift, Sensation to Light Touch Intact    Pulmonary: Clear to Auscultation, No Rales, No Rhonchi, No Wheezes     Cardiovascular: S1, S2, Regular Rate and Rhythm     Gastrointestinal: Soft, Nontender, Nondistended     Extremities: No calf tenderness     Incision:     ICU Vital Signs Last 24 Hrs  T(C): 36.4 (15 Nov 2022 14:30), Max: 36.8 (15 Nov 2022 09:56)  T(F): 97.6 (15 Nov 2022 14:30), Max: 98.2 (15 Nov 2022 09:56)  HR: 85 (15 Nov 2022 21:00) (59 - 91)  BP: 190/93 (15 Nov 2022 09:56) (190/93 - 190/93)  ABP: 123/42 (15 Nov 2022 21:00) (91/39 - 144/60)  ABP(mean): 73 (15 Nov 2022 21:00) (57 - 91)  RR: 13 (15 Nov 2022 21:00) (13 - 20)  SpO2: 100% (15 Nov 2022 21:00) (96% - 100%)    O2 Parameters below as of 15 Nov 2022 19:00  Patient On (Oxygen Delivery Method): room air          ABG - ( 15 Nov 2022 13:21 )  pH, Arterial: 7.27  pH, Blood: x     /  pCO2: 46    /  pO2: 191   / HCO3: 21    / Base Excess: -5.6  /  SaO2: 99.5              I&O's Detail    15 Nov 2022 07:01  -  15 Nov 2022 21:55  --------------------------------------------------------  IN:    Cangrelor Infusion: 380.8 mL    sodium chloride 0.9%: 490 mL    Sodium Chloride 0.9% Bolus: 500 mL  Total IN: 1370.8 mL    OUT:    Indwelling Catheter - Urethral (mL): 340 mL  Total OUT: 340 mL    Total NET: 1030.8 mL              LABS:                RADIOLOGY & ADDITIONAL STUDIES:

## 2022-11-15 NOTE — CHART NOTE - NSCHARTNOTEFT_GEN_A_CORE
Interventional Neuro Radiology  Pre-Procedure Note PA-C      This is a 77 year old female with a past medical history significant for Bladder Cancer, S/P TURBT, HTN, HLD, Incidentally found ACOM artery Aneurysm, S/P  Endovascular Coiling 12/3/2021. 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 She denies any motor sensory speech visual abnormalities. She presents to PST today for evaluation prior to scheduled Cerebral Angiogram and Embolization  on 11/15/2022.          Allergies: No Known Allergies  PMHX: Stenosis of brachiocephalic artery, Hypertension, ACOM artery aneurysm, Bladder cancer          Torn meniscus  -left knee, getting PT      H/O cerebral aneurysm repair  coiling 12/2021      S/P ORIF (open reduction internal fixation) fracture  -right wrist 2019      Bladder cancer  -S/P TURBT 15 years ago      H/O colonoscopy      S/P coil embolization of cerebral aneurysm  -december 2021, f/u cerebral angiogram 8/2022      S/P appendectomy          Social History:     FAMILY HISTORY:      Current Medications: cangrelor Infusion 2 MICROgram(s)/kG/Min IV Continuous <Continuous>  clopidogrel Tablet 600 milliGRAM(s) Oral once      Labs:                   Blood Bank:       Assessment/Plan:   This is a 77 year old right hand dominant female with an ACOM artery aneurysm, previously coiled.   Patient presents to neuro-IR for selective cerebral angiography.   Procedure, goals, risks, benefits and alternatives  were discussed with patient and patient's family. All questions were answered.  Risks include but are not limited to stroke, vessel injury, hemorrhage, and or right  groin hematoma. Patient demonstrates understanding of all risks involved with this procedure and wishes to continue. Appropriate consent as obtained from patient and consent is in the patient's chart. Interventional Neuro Radiology  Pre-Procedure Note PA-C      This is a 77 year old right hand dominant female with a past medical history significant for Bladder Cancer, S/P TURBT, HTN, HLD, Incidentally found ACOM artery Aneurysm, S/P  Endovascular Coiling 12/3/2021. 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 She denies any motor sensory speech visual abnormalities. She presents to PST today for evaluation prior to scheduled Cerebral Angiogram and Embolization  on 11/15/2022.          Allergies: No Known Allergies  PMHX: Stenosis of brachiocephalic artery, Hypertension, ACOM artery aneurysm, Bladder cancer          Torn meniscus  -left knee, getting PT      H/O cerebral aneurysm repair  coiling 12/2021      S/P ORIF (open reduction internal fixation) fracture  -right wrist 2019      Bladder cancer  -S/P TURBT 15 years ago      H/O colonoscopy      S/P coil embolization of cerebral aneurysm  -december 2021, f/u cerebral angiogram 8/2022      S/P appendectomy          Social History:     FAMILY HISTORY:      Current Medications: cangrelor Infusion 2 MICROgram(s)/kG/Min IV Continuous <Continuous>  clopidogrel Tablet 600 milliGRAM(s) Oral once      Labs:                   Blood Bank:       Assessment/Plan:   This is a 77 year old right hand dominant female with an ACOM artery aneurysm, previously coiled.   Patient presents to neuro-IR for selective cerebral angiography.   Procedure, goals, risks, benefits and alternatives  were discussed with patient and patient's family. All questions were answered.  Risks include but are not limited to stroke, vessel injury, hemorrhage, and or right  groin hematoma. Patient demonstrates understanding of all risks involved with this procedure and wishes to continue. Appropriate consent as obtained from patient and consent is in the patient's chart. Interventional Neuro Radiology  Pre-Procedure Note PA-C      This is a 77 year old right hand dominant female with a past medical history significant for Bladder Cancer, S/P TURBT, HTN, HLD, Incidentally found ACOM artery Aneurysm, S/P  Endovascular Coiling 12/3/2021. 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 She denies any motor sensory speech visual abnormalities. She presents to PST to Neuro IR for a selective cerebral angiogram and embolization of ACOM artery aneurysm. Patient is also has subclavian steal syndrome and will have a stent placed in the innominate.     Upon exam patient is A+ O x 3, speech is fluent, recent and remote memory intact, follows commands. moves all extremities with good strength.     Allergies: No Known Allergies  PMHX: Stenosis of brachiocephalic artery, Hypertension, ACOM artery aneurysm, Bladder cancer, Subclavian steal syndrome   PSHX: left torn meniscus, ACOM artery aneurysm coiling at [place], Right wrist ORIF (open reduction internal fixation) fracture, TURBT 15 years ago, colonoscopy, appendectomy  Social History:     FAMILY HISTORY: NOn-contributory   Current Medications: ASA 81mg, clopidogrel Tablet 600 milliGRAM(s) Oral once  Covid: not detected     PRU: 408           12.6  6.72  38.9   255    140  103  23   4.3    23  0.98  108      Blood Bank: A negative     Assessment/Plan:   This is a 77 year old right hand dominant female with an ACOM artery aneurysm, previously coiled, who returns to Neuro IR for a selective cerebral angiogram and stent placement. Procedure, goals, risks, benefits and alternatives were discussed with patient and patient's . All questions were answered. Risks include but are not limited to stroke, vessel injury, hemorrhage, and or right groin hematoma. Patient demonstrates understanding of all risks involved with this procedure and wishes to continue. Appropriate consent as obtained from patient and consent is in the patient's chart.

## 2022-11-15 NOTE — PROGRESS NOTE ADULT - ASSESSMENT
77F hx bladder cancer, HTN, a.comm aneurysm s/p coil (2021 dec) now s/p DSA stent of a.comm & innominate artery stent POD0    -Plavix loaded during day, c/w asa 81mg & cangrelor ggt likely d/c in AM after MR NOVA  -SBP goal 100-160  -regular diet  -d/c bowel regimen given loose stools   -xanax 0.25mg 1mg prior to MRI in AM   -d/c negrete initiate TOV  77F hx bladder cancer, HTN, a.comm aneurysm s/p coil (2021 dec) now s/p DSA stent of a.comm & innominate artery stent POD0    -Plavix loaded during day, c/w asa 81mg & cangrelor @ 2mcg/kg/min ggt likely d/c in AM after MR NOVA  -SBP goal 100-160  -regular diet  -d/c bowel regimen given loose stools   -xanax 0.25mg 1mg prior to MRI in AM   -d/c negrete initiate TOV

## 2022-11-15 NOTE — CONSULT NOTE ADULT - SUBJECTIVE AND OBJECTIVE BOX
CHIEF COMPLAINT:Patient is a 77y old  Female who presents with a chief complaint of " I am  have a Cerebral Angiogram and stent " (11 Nov 2022 10:06)      HISTORY OF PRESENT ILLNESS:    77 female known to me from office with history as below , aortic stenosis , brachiocephalic stenosis , is  s/p inominate artery angioplasty stenting for subclavian steal and PAUL stenting for residual ACOM aneurysm  feels well   No chest pain, dyspnea, palpitation, or dizziness.     PAST MEDICAL & SURGICAL HISTORY:  Stenosis of brachiocephalic artery      Hypertension      History of cerebral aneurysm      Bladder cancer      COVID-19 virus infection  -dec 2021 (mild symptoms)      Torn meniscus  -left knee, getting PT      H/O cerebral aneurysm repair  coiling 12/2021      S/P ORIF (open reduction internal fixation) fracture  -right wrist 2019      Bladder cancer  -S/P TURBT 15 years ago      H/O colonoscopy      S/P coil embolization of cerebral aneurysm  -december 2021, f/u cerebral angiogram 8/2022      S/P appendectomy              MEDICATIONS:  cangrelor Infusion 2 MICROgram(s)/kG/Min IV Continuous <Continuous>  enalapril 10 milliGRAM(s) Oral daily        acetaminophen     Tablet .. 650 milliGRAM(s) Oral every 6 hours PRN    senna 2 Tablet(s) Oral at bedtime    simvastatin 5 milliGRAM(s) Oral at bedtime    sodium chloride 0.9%. 1000 milliLiter(s) IV Continuous <Continuous>      FAMILY HISTORY:      Non-contributory    SOCIAL HISTORY:    No tobacco, drugs or etoh    Allergies    No Known Allergies    Intolerances    	    REVIEW OF SYSTEMS:  as above  The rest of the 14 points ROS reviewed and except above they are unremarkable.        PHYSICAL EXAM:  T(C): 36.4 (11-15-22 @ 14:30), Max: 36.8 (11-15-22 @ 09:56)  HR: 66 (11-15-22 @ 18:00) (59 - 91)  BP: 103 / 44   RR: 18 (11-15-22 @ 18:00) (13 - 20)  SpO2: 99% (11-15-22 @ 18:00) (96% - 100%)  Wt(kg): --  I&O's Summary    15 Nov 2022 07:01  -  15 Nov 2022 18:28  --------------------------------------------------------  IN: 997.6 mL / OUT: 340 mL / NET: 657.6 mL        JVP: Normal  Neck: supple  Lung: clear   CV: S1 S2 , Murmur: pos kim   Abd: soft  Ext: No edema  neuro: Awake / alert  Psych: flat affect  Skin: normal    LABS/DATA:    TELEMETRY: 	    ECG:  	   	  CARDIAC MARKERS:                        proBNP:   Lipid Profile:   HgA1c:   TSH:

## 2022-11-15 NOTE — PROGRESS NOTE ADULT - ASSESSMENT
ASSESSMENT/PLAN:    s/p inominate artery angioplasty stenting for subclavian steal and PAUL stenting for residual ACOM aneurysm      NEURO:  s/p coiling of aneurysm 12/03/21  - neuro checks q1h  - MR NOVA tomorrow  - s/p 600mg plavix load in IR; took asa today, continue tomorrow  - cangelor  - aru/pru  - Activity: PT/OT as tolerated    CVS:  - SBP goal 100-160  - c/w home lisinopril  - c/w home lipitor    PULM:  - RA  - IS As tolerated  - Aspiration precautions    RENAL:  - Fluids: 75cc/h ns until tolerating full diet  - daily IOs    GI:  - Diet: ADAT  - GI prophylaxis: na  - Bowel regimen: miralax, senna    ENDO:   - FS goal 120-180    HEME/ONC:  - SCDs  - Chemoppx: hold d/t fresh post op  - LED    ID:  - monitor for fevers

## 2022-11-16 ENCOUNTER — TRANSCRIPTION ENCOUNTER (OUTPATIENT)
Age: 77
End: 2022-11-16

## 2022-11-16 LAB
ANION GAP SERPL CALC-SCNC: 10 MMOL/L — SIGNIFICANT CHANGE UP (ref 5–17)
ANION GAP SERPL CALC-SCNC: 10 MMOL/L — SIGNIFICANT CHANGE UP (ref 5–17)
BUN SERPL-MCNC: 12 MG/DL — SIGNIFICANT CHANGE UP (ref 7–23)
BUN SERPL-MCNC: 16 MG/DL — SIGNIFICANT CHANGE UP (ref 7–23)
CALCIUM SERPL-MCNC: 8.2 MG/DL — LOW (ref 8.4–10.5)
CALCIUM SERPL-MCNC: 8.7 MG/DL — SIGNIFICANT CHANGE UP (ref 8.4–10.5)
CHLORIDE SERPL-SCNC: 109 MMOL/L — HIGH (ref 96–108)
CHLORIDE SERPL-SCNC: 111 MMOL/L — HIGH (ref 96–108)
CO2 SERPL-SCNC: 20 MMOL/L — LOW (ref 22–31)
CO2 SERPL-SCNC: 21 MMOL/L — LOW (ref 22–31)
CREAT SERPL-MCNC: 0.83 MG/DL — SIGNIFICANT CHANGE UP (ref 0.5–1.3)
CREAT SERPL-MCNC: 1.07 MG/DL — SIGNIFICANT CHANGE UP (ref 0.5–1.3)
EGFR: 53 ML/MIN/1.73M2 — LOW
EGFR: 72 ML/MIN/1.73M2 — SIGNIFICANT CHANGE UP
GLUCOSE SERPL-MCNC: 108 MG/DL — HIGH (ref 70–99)
GLUCOSE SERPL-MCNC: 116 MG/DL — HIGH (ref 70–99)
HCT VFR BLD CALC: 30.3 % — LOW (ref 34.5–45)
HGB BLD-MCNC: 9.7 G/DL — LOW (ref 11.5–15.5)
MAGNESIUM SERPL-MCNC: 1.7 MG/DL — SIGNIFICANT CHANGE UP (ref 1.6–2.6)
MAGNESIUM SERPL-MCNC: 2.2 MG/DL — SIGNIFICANT CHANGE UP (ref 1.6–2.6)
MAGNESIUM SERPL-MCNC: 2.2 MG/DL — SIGNIFICANT CHANGE UP (ref 1.6–2.6)
MCHC RBC-ENTMCNC: 29.5 PG — SIGNIFICANT CHANGE UP (ref 27–34)
MCHC RBC-ENTMCNC: 32 GM/DL — SIGNIFICANT CHANGE UP (ref 32–36)
MCV RBC AUTO: 92.1 FL — SIGNIFICANT CHANGE UP (ref 80–100)
NRBC # BLD: 0 /100 WBCS — SIGNIFICANT CHANGE UP (ref 0–0)
PA ADP PRP-ACNC: 229 PRU — SIGNIFICANT CHANGE UP (ref 194–417)
PA ADP PRP-ACNC: 242 PRU — SIGNIFICANT CHANGE UP (ref 194–417)
PHOSPHATE SERPL-MCNC: 3.2 MG/DL — SIGNIFICANT CHANGE UP (ref 2.5–4.5)
PHOSPHATE SERPL-MCNC: 3.6 MG/DL — SIGNIFICANT CHANGE UP (ref 2.5–4.5)
PHOSPHATE SERPL-MCNC: 3.6 MG/DL — SIGNIFICANT CHANGE UP (ref 2.5–4.5)
PLATELET # BLD AUTO: 170 K/UL — SIGNIFICANT CHANGE UP (ref 150–400)
PLATELET RESPONSE ASPIRIN RESULT: 404 ARU — SIGNIFICANT CHANGE UP (ref 350–700)
POTASSIUM SERPL-MCNC: 3.6 MMOL/L — SIGNIFICANT CHANGE UP (ref 3.5–5.3)
POTASSIUM SERPL-MCNC: 4.1 MMOL/L — SIGNIFICANT CHANGE UP (ref 3.5–5.3)
POTASSIUM SERPL-SCNC: 3.6 MMOL/L — SIGNIFICANT CHANGE UP (ref 3.5–5.3)
POTASSIUM SERPL-SCNC: 4.1 MMOL/L — SIGNIFICANT CHANGE UP (ref 3.5–5.3)
RBC # BLD: 3.29 M/UL — LOW (ref 3.8–5.2)
RBC # FLD: 13 % — SIGNIFICANT CHANGE UP (ref 10.3–14.5)
SODIUM SERPL-SCNC: 140 MMOL/L — SIGNIFICANT CHANGE UP (ref 135–145)
SODIUM SERPL-SCNC: 141 MMOL/L — SIGNIFICANT CHANGE UP (ref 135–145)
WBC # BLD: 10.26 K/UL — SIGNIFICANT CHANGE UP (ref 3.8–10.5)
WBC # FLD AUTO: 10.26 K/UL — SIGNIFICANT CHANGE UP (ref 3.8–10.5)

## 2022-11-16 PROCEDURE — 70547 MR ANGIOGRAPHY NECK W/O DYE: CPT | Mod: 26

## 2022-11-16 PROCEDURE — 70551 MRI BRAIN STEM W/O DYE: CPT | Mod: 26

## 2022-11-16 PROCEDURE — 93970 EXTREMITY STUDY: CPT | Mod: 26

## 2022-11-16 PROCEDURE — 70544 MR ANGIOGRAPHY HEAD W/O DYE: CPT | Mod: 26,59

## 2022-11-16 PROCEDURE — 99233 SBSQ HOSP IP/OBS HIGH 50: CPT

## 2022-11-16 RX ORDER — CLOPIDOGREL BISULFATE 75 MG/1
300 TABLET, FILM COATED ORAL ONCE
Refills: 0 | Status: COMPLETED | OUTPATIENT
Start: 2022-11-16 | End: 2022-11-16

## 2022-11-16 RX ORDER — ONDANSETRON 8 MG/1
4 TABLET, FILM COATED ORAL ONCE
Refills: 0 | Status: COMPLETED | OUTPATIENT
Start: 2022-11-16 | End: 2022-11-16

## 2022-11-16 RX ORDER — EZETIMIBE 10 MG/1
5 TABLET ORAL
Qty: 0 | Refills: 0 | DISCHARGE

## 2022-11-16 RX ORDER — MAGNESIUM SULFATE 500 MG/ML
2 VIAL (ML) INJECTION ONCE
Refills: 0 | Status: COMPLETED | OUTPATIENT
Start: 2022-11-16 | End: 2022-11-16

## 2022-11-16 RX ORDER — HEPARIN SODIUM 5000 [USP'U]/ML
5000 INJECTION INTRAVENOUS; SUBCUTANEOUS EVERY 12 HOURS
Refills: 0 | Status: DISCONTINUED | OUTPATIENT
Start: 2022-11-16 | End: 2022-11-17

## 2022-11-16 RX ORDER — SODIUM CHLORIDE 9 MG/ML
500 INJECTION, SOLUTION INTRAVENOUS
Refills: 0 | Status: DISCONTINUED | OUTPATIENT
Start: 2022-11-16 | End: 2022-11-16

## 2022-11-16 RX ORDER — HALOPERIDOL DECANOATE 100 MG/ML
5 INJECTION INTRAMUSCULAR ONCE
Refills: 0 | Status: DISCONTINUED | OUTPATIENT
Start: 2022-11-16 | End: 2022-11-16

## 2022-11-16 RX ORDER — TICAGRELOR 90 MG/1
180 TABLET ORAL ONCE
Refills: 0 | Status: COMPLETED | OUTPATIENT
Start: 2022-11-16 | End: 2022-11-16

## 2022-11-16 RX ORDER — POTASSIUM CHLORIDE 20 MEQ
40 PACKET (EA) ORAL ONCE
Refills: 0 | Status: COMPLETED | OUTPATIENT
Start: 2022-11-16 | End: 2022-11-16

## 2022-11-16 RX ORDER — POTASSIUM CHLORIDE 20 MEQ
40 PACKET (EA) ORAL ONCE
Refills: 0 | Status: COMPLETED | OUTPATIENT
Start: 2022-11-16 | End: 2022-11-17

## 2022-11-16 RX ORDER — FAMOTIDINE 10 MG/ML
20 INJECTION INTRAVENOUS
Refills: 0 | Status: DISCONTINUED | OUTPATIENT
Start: 2022-11-16 | End: 2022-11-16

## 2022-11-16 RX ORDER — FAMOTIDINE 10 MG/ML
20 INJECTION INTRAVENOUS DAILY
Refills: 0 | Status: DISCONTINUED | OUTPATIENT
Start: 2022-11-16 | End: 2022-11-16

## 2022-11-16 RX ORDER — CHLORHEXIDINE GLUCONATE 213 G/1000ML
1 SOLUTION TOPICAL DAILY
Refills: 0 | Status: DISCONTINUED | OUTPATIENT
Start: 2022-11-16 | End: 2022-11-17

## 2022-11-16 RX ORDER — PANTOPRAZOLE SODIUM 20 MG/1
40 TABLET, DELAYED RELEASE ORAL
Refills: 0 | Status: DISCONTINUED | OUTPATIENT
Start: 2022-11-16 | End: 2022-11-17

## 2022-11-16 RX ADMIN — SODIUM CHLORIDE 50 MILLILITER(S): 9 INJECTION, SOLUTION INTRAVENOUS at 09:25

## 2022-11-16 RX ADMIN — CLOPIDOGREL BISULFATE 300 MILLIGRAM(S): 75 TABLET, FILM COATED ORAL at 12:38

## 2022-11-16 RX ADMIN — Medication 10 MILLIGRAM(S): at 06:19

## 2022-11-16 RX ADMIN — Medication 25 GRAM(S): at 09:25

## 2022-11-16 RX ADMIN — CLOPIDOGREL BISULFATE 75 MILLIGRAM(S): 75 TABLET, FILM COATED ORAL at 06:19

## 2022-11-16 RX ADMIN — ONDANSETRON 4 MILLIGRAM(S): 8 TABLET, FILM COATED ORAL at 15:00

## 2022-11-16 RX ADMIN — Medication 325 MILLIGRAM(S): at 06:20

## 2022-11-16 RX ADMIN — Medication 650 MILLIGRAM(S): at 18:25

## 2022-11-16 RX ADMIN — Medication 0.25 MILLIGRAM(S): at 09:24

## 2022-11-16 RX ADMIN — FAMOTIDINE 20 MILLIGRAM(S): 10 INJECTION INTRAVENOUS at 12:38

## 2022-11-16 RX ADMIN — Medication 650 MILLIGRAM(S): at 18:55

## 2022-11-16 RX ADMIN — Medication 40 MILLIEQUIVALENT(S): at 06:18

## 2022-11-16 RX ADMIN — TICAGRELOR 180 MILLIGRAM(S): 90 TABLET ORAL at 20:40

## 2022-11-16 NOTE — PROGRESS NOTE ADULT - SUBJECTIVE AND OBJECTIVE BOX
DATE OF SERVICE: 11-16-22 @ 08:33    Subjective: Patient seen and examined. No new events except as noted.     SUBJECTIVE/ROS:  feels well   No chest pain, dyspnea, palpitation, or dizziness.       MEDICATIONS:  MEDICATIONS  (STANDING):  aspirin 325 milliGRAM(s) Oral <User Schedule>  cangrelor Infusion 2 MICROgram(s)/kG/Min (54.4 mL/Hr) IV Continuous <Continuous>  clopidogrel Tablet 75 milliGRAM(s) Oral <User Schedule>  enalapril 10 milliGRAM(s) Oral daily      PHYSICAL EXAM:  T(C): 36.9 (11-16-22 @ 07:00), Max: 37.2 (11-15-22 @ 23:00)  HR: 72 (11-16-22 @ 07:00) (59 - 91)  BP: 141/62 (11-16-22 @ 07:00) (103/50 - 190/93)  RR: 15 (11-16-22 @ 07:00) (12 - 20)  SpO2: 99% (11-16-22 @ 07:00) (95% - 100%)  Wt(kg): --  I&O's Summary    15 Nov 2022 07:01  -  16 Nov 2022 07:00  --------------------------------------------------------  IN: 2414.8 mL / OUT: 840 mL / NET: 1574.8 mL    16 Nov 2022 07:01  -  16 Nov 2022 08:33  --------------------------------------------------------  IN: 54.4 mL / OUT: 350 mL / NET: -295.6 mL      Height (cm): 157.5 (11-15 @ 09:56)  Weight (kg): 90.7 (11-15 @ 09:56)  BMI (kg/m2): 36.6 (11-15 @ 09:56)  BSA (m2): 1.91 (11-15 @ 09:56)      JVP: Normal  Neck: supple  Lung: clear   CV: S1 S2 , Murmur:  Abd: soft  Ext: No edema  neuro: Awake / alert  Psych: flat affect  Skin: normal``    LABS/DATA:    CARDIAC MARKERS:                                9.5    8.55  )-----------( 183      ( 15 Nov 2022 23:41 )             28.8     11-15    140  |  109<H>  |  16  ----------------------------<  116<H>  3.6   |  21<L>  |  1.07    Ca    8.2<L>      15 Nov 2022 23:42  Phos  3.6     11-15  Mg     1.7     11-15      proBNP:   Lipid Profile:   HgA1c:   TSH:     TELE:  EKG:

## 2022-11-16 NOTE — CHART NOTE - NSCHARTNOTEFT_GEN_A_CORE
CAPRINI SCORE [CLOT]    AGE RELATED RISK FACTORS                                                       MOBILITY RELATED FACTORS  [ ] Age 41-60 years                                            (1 Point)                  [ ] Bed rest                                                        (1 Point)  [ ] Age: 61-74 years                                           (2 Points)                 [ ] Plaster cast                                                   (2 Points)  [x ] Age= 75 years                                              (3 Points)                 [ ] Bed bound for more than 72 hours                 (2 Points)    DISEASE RELATED RISK FACTORS                                               GENDER SPECIFIC FACTORS  [ ] Edema in the lower extremities                       (1 Point)                  [ ] Pregnancy                                                     (1 Point)  [ ] Varicose veins                                               (1 Point)                  [ ] Post-partum < 6 weeks                                   (1 Point)             [ ] BMI > 25 Kg/m2                                            (1 Point)                  [ ] Hormonal therapy  or oral contraception          (1 Point)                 [ ] Sepsis (in the previous month)                        (1 Point)                  [ ] History of pregnancy complications                 (1 point)  [ ] Pneumonia or serious lung disease                                               [ ] Unexplained or recurrent                     (1 Point)           (in the previous month)                               (1 Point)  [ ] Abnormal pulmonary function test                     (1 Point)                 SURGERY RELATED RISK FACTORS  [ ] Acute myocardial infarction                              (1 Point)                 [ ]  Section                                             (1 Point)  [ ] Congestive heart failure (in the previous month)  (1 Point)               [ ] Minor surgery                                                  (1 Point)   [ ] Inflammatory bowel disease                             (1 Point)                 [ ] Arthroscopic surgery                                        (2 Points)  [ ] Central venous access                                      (2 Points)                [x ] General surgery lasting more than 45 minutes   (2 Points)       [ ] Stroke (in the previous month)                          (5 Points)               [ ] Elective arthroplasty                                         (5 Points)           [x] history of malignancy                                       (2 points)                                                                                                                                       HEMATOLOGY RELATED FACTORS                                                 TRAUMA RELATED RISK FACTORS  [ ] Prior episodes of VTE                                     (3 Points)                [ ] Fracture of the hip, pelvis, or leg                       (5 Points)  [ ] Positive family history for VTE                         (3 Points)                 [ ] Acute spinal cord injury (in the previous month)  (5 Points)  [ ] Prothrombin 25152 A                                     (3 Points)                 [ ] Paralysis  (less than 1 month)                             (5 Points)  [ ] Factor V Leiden                                             (3 Points)                  [ ] Multiple Trauma within 1 month                        (5 Points)  [ ] Lupus anticoagulants                                     (3 Points)                                                           [ ] Anticardiolipin antibodies                               (3 Points)                                                       [ ] High homocysteine in the blood                      (3 Points)                                             [ ] Other congenital or acquired thrombophilia      (3 Points)                                                [ ] Heparin induced thrombocytopenia                  (3 Points)                                          Total Score [      7    ]    Caprini Score 0 - 2:  Low Risk, No VTE Prophylaxis required for most patients, encourage ambulation  Caprini Score 3 - 6:  At Risk, pharmacologic VTE prophylaxis is indicated for most patients (in the absence of a contraindication)  Caprini Score Greater than or = 7:  High Risk, pharmacologic VTE prophylaxis is indicated for most patients (in the absence of a contraindication)

## 2022-11-16 NOTE — PROGRESS NOTE ADULT - SUBJECTIVE AND OBJECTIVE BOX
HPI:  77 year old female with PMH of Bladder Cancer, S/P TURBT, HTN, HLD, Incidentally found ACOMM Aneurysm, S/P  Endovascular Coiling 12/3/2021. 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 She denies any motor sensory speech visual abnormalities. She presents to [facility] today for evaluation prior to scheduled Cerebral Angiogram and Embolization  on 11/15/2022.    of note elevated blood pressure noted at PST as a result of brachiocephalic stenosis.    preop covid swab 11/11 @ [facility] (11 Nov 2022 10:06)          24 HOUR EVENTS:   - POD1 s/p inominate artery angioplasty stenting for subclavian steal and PAUL stenting for residual ACOM aneurysm        ROS:  - negative except as above    T(C): 36.6 (11-16-22 @ 03:00), Max: 37.2 (11-15-22 @ 23:00)  T(F): 97.9 (11-16-22 @ 03:00), Max: 98.9 (11-15-22 @ 23:00)  HR: 76 (11-16-22 @ 06:00) (59 - 91)  BP: 160/70 (11-16-22 @ 06:00) (103/50 - 190/93)  ABP: 149/60 (11-16-22 @ 06:00) (91/39 - 149/60)  ABP(mean): 92 (11-16-22 @ 06:00) (57 - 92)  RR: 15 (11-16-22 @ 06:00) (12 - 20)  SpO2: 98% (11-16-22 @ 06:00) (95% - 100%)                          9.5    8.55  )-----------( 183      ( 15 Nov 2022 23:41 )             28.8     11-15    140  |  109<H>  |  16  ----------------------------<  116<H>  3.6   |  21<L>  |  1.07    Ca    8.2<L>      15 Nov 2022 23:42  Phos  3.6     11-15  Mg     1.7     11-15                   MEDICATIONS:  MEDICATIONS  (STANDING):  aspirin 325 milliGRAM(s) Oral <User Schedule>  cangrelor Infusion 2 MICROgram(s)/kG/Min (54.4 mL/Hr) IV Continuous <Continuous>  clopidogrel Tablet 75 milliGRAM(s) Oral <User Schedule>  enalapril 10 milliGRAM(s) Oral daily              PHYSICAL EXAM:    General: calm  CVS: RRR  Pulm: CTAB  GI: Soft, NTND  Extremities: No LE Edema, 1+ distal pulses LLE, no groin hematoma  Neuro: AOx3, PERRL, EOMI, facial symmetrical, fluent speech, motor 5/5 throughout, no PND, sensation in tact                 HPI:  77 year old female with PMH of Bladder Cancer, S/P TURBT, HTN, HLD, Incidentally found A[employer] Aneurysm, S/P  Endovascular Coiling 12/3/2021. 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 She denies any motor sensory speech visual abnormalities. She presents to [facility] today for evaluation prior to scheduled Cerebral Angiogram and Embolization  on 11/15/2022.    of note elevated blood pressure noted at PST as a result of brachiocephalic stenosis.    preop covid swab 11/11 @ [facility] (11 Nov 2022 10:06)      PAST MEDICAL & SURGICAL HISTORY:  Stenosis of brachiocephalic artery      Hypertension      History of cerebral aneurysm      Bladder cancer      COVID-19 virus infection  -dec 2021 (mild symptoms)      Torn meniscus  -left knee, getting PT      H/O cerebral aneurysm repair  coiling 12/2021      S/P ORIF (open reduction internal fixation) fracture  -right wrist 2019      Bladder cancer  -S/P TURBT 15 years ago      H/O colonoscopy      S/P coil embolization of cerebral aneurysm  -december 2021, f/u cerebral angiogram 8/2022      S/P appendectomy          24 HOUR EVENTS:   - POD1 s/p inominate artery angioplasty stenting for subclavian steal and PAUL stenting for residual ACOM aneurysm            REVIEW OF SYSTEMS: [ ] Unable to Assess due to neurologic exam   [ x] All ROS addressed below are non-contributory, except:  Neuro: [ ] Headache [ ] Back pain [ ] Numbness [ ] Weakness [ ] Ataxia [ ] Dizziness [ ] Aphasia [ ] Dysarthria [ ] Visual disturbance  Resp: [ ] Shortness of breath/dyspnea, [ ] Orthopnea [ ] Cough  CV: [ ] Chest pain [ ] Palpitation [ ] Lightheadedness [ ] Syncope  Renal: [ ] Thirst [ ] Edema  GI: [ ] Nausea [ ] Emesis [ ] Abdominal pain [ ] Constipation [ ] Diarrhea  Hem: [ ] Hematemesis [ ] bright red blood per rectum  ID: [ ] Fever [ ] Chills [ ] Dysuria  ENT: [ ] Rhinorrhea        No Known Allergies    Intolerances    T(C): 36.9 (11-16-22 @ 07:00), Max: 37.2 (11-15-22 @ 23:00)  HR: 72 (11-16-22 @ 07:00) (59 - 91)  BP: 141/62 (11-16-22 @ 07:00) (103/50 - 190/93)  RR: 15 (11-16-22 @ 07:00) (12 - 20)  SpO2: 99% (11-16-22 @ 07:00) (95% - 100%)  11-15-22 @ 07:01  -  11-16-22 @ 07:00  --------------------------------------------------------  IN: 2414.8 mL / OUT: 840 mL / NET: 1574.8 mL    11-16-22 @ 07:01  -  11-16-22 @ 09:09  --------------------------------------------------------  IN: 54.4 mL / OUT: 350 mL / NET: -295.6 mL    acetaminophen     Tablet .. 650 milliGRAM(s) Oral every 6 hours PRN  ALPRAZolam 0.25 milliGRAM(s) Oral once PRN  aspirin 325 milliGRAM(s) Oral <User Schedule>  cangrelor Infusion 2 MICROgram(s)/kG/Min IV Continuous <Continuous>  clopidogrel Tablet 75 milliGRAM(s) Oral <User Schedule>  enalapril 10 milliGRAM(s) Oral daily        PHYSICAL EXAM:    General: calm  CVS: RRR  Pulm: CTAB  GI: Soft, NTND  Extremities: No LE Edema, 1+ distal pulses LLE, no groin hematoma  has good right radial pulse, and hand is warm and well perfused   Neuro: AOx3, PERRL, EOMI, facial symmetrical, fluent speech, motor 5/5 throughout, no PND, sensation in tact      LABS:  Na: 140 (11-15 @ 23:42)  K: 3.6 (11-15 @ 23:42)  Cl: 109 (11-15 @ 23:42)  CO2: 21 (11-15 @ 23:42)  BUN: 16 (11-15 @ 23:42)  Cr: 1.07 (11-15 @ 23:42)  Glu: 116(11-15 @ 23:42)    Hgb: 9.5 (11-15 @ 23:41)  Hct: 28.8 (11-15 @ 23:41)  WBC: 8.55 (11-15 @ 23:41)  Plt: 183 (11-15 @ 23:41)    INR:   PTT:

## 2022-11-16 NOTE — PROGRESS NOTE ADULT - ASSESSMENT
ASSESSMENT/PLAN:    s/p inominate artery angioplasty stenting for subclavian steal and PAUL stenting for residual ACOM aneurysm      NEURO:  s/p coiling of aneurysm 12/03/21  - neuro checks q2h  - MR NOVA today  -c/w ASA/Plavix  - cangelor plan pending NSG  - aru/pru therapeutic  - Activity: PT/OT as tolerated    CVS:  - SBP goal 100-160  - c/w home lisinopril; hold in s/o drew  - c/w home lipitor  - severe AS, cardiology recommending TTE in 6 months    PULM:  - RA  - IS As tolerated  - Aspiration precautions    RENAL:  - Fluids: 75cc/h ns until tolerating full diet in the s/o drew  - daily IOs  - monitor renal function    GI:  - Diet: Regular  - GI prophylaxis: na  - Bowel regimen: miralax, senna    ENDO:   - FS goal 120-180    HEME/ONC:  - SCDs  - Chemoppx: sqh  - LED  - monitor h/h, trending down    ID:  - monitor for fevers       ASSESSMENT/PLAN:    s/p POD 1 from  inominate artery angioplasty stenting for subclavian steal and PAUL stenting for residual ACOM aneurysm      NEURO:  - neuro checks q4h  - MR NOVA today  -c/w ASA/Plavix  - cangelor plan pending NSG  - aru/pru therapeutic  - Activity: PT/OT as tolerated    CVS:  - SBP goal 100-160  - c/w home lisinopril; hold in s/o drew  - c/w home lipitor  - severe AS, cardiology recommending TTE in 6 months    PULM:  - RA  - IS As tolerated  - Aspiration precautions    RENAL:  - Fluids: 75cc/h ns until tolerating full diet in the s/o drew  - daily IOs  - monitor renal function    GI:  - Diet: Regular  - GI prophylaxis: na  - Bowel regimen: miralax, senna    ENDO:   - FS goal 120-180    HEME/ONC:  - SCDs  - Chemoppx: sqh  - LED  - monitor h/h, trending down    ID:  - monitor for fevers

## 2022-11-16 NOTE — PROGRESS NOTE ADULT - ASSESSMENT
ASSESSMENT/PLAN:    s/p POD 1 from  inominate artery angioplasty stenting for subclavian steal and PAUL stenting for residual ACOM aneurysm      NEURO:  - neuro checks q4h  - MR NOVA today  -c/w ASA/Plavix  - cangelor plan pending NSG  - aru/pru therapeutic  - Activity: PT/OT as tolerated    CVS:  - SBP goal 100-160  - c/w home lisinopril; hold in s/o drew  - c/w home lipitor  - severe AS, cardiology recommending TTE in 6 months    PULM:  - RA  - IS As tolerated  - Aspiration precautions    RENAL:  - Fluids: 75cc/h ns until tolerating full diet in the s/o drew  - daily IOs  - monitor renal function    GI:  - Diet: Regular  - GI prophylaxis: na  - Bowel regimen: miralax, senna    ENDO:   - FS goal 120-180    HEME/ONC:  - SCDs  - Chemoppx: sqh  - LED  - monitor h/h, trending down    ID:  - monitor for fevers       ASSESSMENT/PLAN:    s/p POD 1 from  inominate artery angioplasty stenting for subclavian steal and PAUL stenting for residual ACOM aneurysm      NEURO:  - neuro checks q4h  - MR NOVA today  - Failed ASA/Plavix trial- subtherapeutic P2Y12  - 11/16- Patient loaded again on 300 of Plavix today- patient given 180 mg of Brilinta today with repeat P2Y12 levels to be drawn in 2 hours  - Activity: PT/OT as tolerated    CVS:  - SBP goal 100-160  - c/w home lisinopril; hold in s/o chelsey  - On enalapril 10 mg QD  - severe AS, cardiology recommending TTE in 6 months    PULM:  - RA  - IS As tolerated  - Aspiration precautions    RENAL:  - Fluids: IVL  - Chelsey now resolved- likely contrast induced   - daily IOs  - monitor renal function    GI:  - Diet: Regular  - GI prophylaxis: Pepcid- home med  - Bowel regimen: miralax, senna  - LBM 11/16    ENDO:   - FS goal 120-180    HEME/ONC:  - SCDs  - Chemoppx: SQH   - LED- negative  - monitor h/h, trending down    ID:  - monitor for fevers

## 2022-11-16 NOTE — PROGRESS NOTE ADULT - SUBJECTIVE AND OBJECTIVE BOX
HPI:  77 year old female with PMH of Bladder Cancer, S/P TURBT, HTN, HLD, Incidentally found ACOMM Aneurysm, S/P  Endovascular Coiling 12/3/2021. 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 She denies any motor sensory speech visual abnormalities. She presents to [facility] today for evaluation prior to scheduled Cerebral Angiogram and Embolization  on 11/15/2022.    of note elevated blood pressure noted at PST as a result of brachiocephalic stenosis.    preop covid swab 11/11 @ [facility] (11 Nov 2022 10:06)      PAST MEDICAL & SURGICAL HISTORY:  Stenosis of brachiocephalic artery      Hypertension      History of cerebral aneurysm      Bladder cancer      COVID-19 virus infection  -dec 2021 (mild symptoms)      Torn meniscus  -left knee, getting PT      H/O cerebral aneurysm repair  coiling 12/2021      S/P ORIF (open reduction internal fixation) fracture  -right wrist 2019      Bladder cancer  -S/P TURBT 15 years ago      H/O colonoscopy      S/P coil embolization of cerebral aneurysm  -december 2021, f/u cerebral angiogram 8/2022      S/P appendectomy          24 HOUR EVENTS:   - POD1 s/p inominate artery angioplasty stenting for subclavian steal and PAUL stenting for residual ACOM aneurysm            REVIEW OF SYSTEMS: [ ] Unable to Assess due to neurologic exam   [ x] All ROS addressed below are non-contributory, except:  Neuro: [ ] Headache [ ] Back pain [ ] Numbness [ ] Weakness [ ] Ataxia [ ] Dizziness [ ] Aphasia [ ] Dysarthria [ ] Visual disturbance  Resp: [ ] Shortness of breath/dyspnea, [ ] Orthopnea [ ] Cough  CV: [ ] Chest pain [ ] Palpitation [ ] Lightheadedness [ ] Syncope  Renal: [ ] Thirst [ ] Edema  GI: [ ] Nausea [ ] Emesis [ ] Abdominal pain [ ] Constipation [ ] Diarrhea  Hem: [ ] Hematemesis [ ] bright red blood per rectum  ID: [ ] Fever [ ] Chills [ ] Dysuria  ENT: [ ] Rhinorrhea        No Known Allergies    Intolerances    T(C): 36.9 (11-16-22 @ 07:00), Max: 37.2 (11-15-22 @ 23:00)  HR: 72 (11-16-22 @ 07:00) (59 - 91)  BP: 141/62 (11-16-22 @ 07:00) (103/50 - 190/93)  RR: 15 (11-16-22 @ 07:00) (12 - 20)  SpO2: 99% (11-16-22 @ 07:00) (95% - 100%)  11-15-22 @ 07:01  -  11-16-22 @ 07:00  --------------------------------------------------------  IN: 2414.8 mL / OUT: 840 mL / NET: 1574.8 mL    11-16-22 @ 07:01  -  11-16-22 @ 09:09  --------------------------------------------------------  IN: 54.4 mL / OUT: 350 mL / NET: -295.6 mL    acetaminophen     Tablet .. 650 milliGRAM(s) Oral every 6 hours PRN  ALPRAZolam 0.25 milliGRAM(s) Oral once PRN  aspirin 325 milliGRAM(s) Oral <User Schedule>  cangrelor Infusion 2 MICROgram(s)/kG/Min IV Continuous <Continuous>  clopidogrel Tablet 75 milliGRAM(s) Oral <User Schedule>  enalapril 10 milliGRAM(s) Oral daily        PHYSICAL EXAM:    General: calm  CVS: RRR  Pulm: CTAB  GI: Soft, NTND  Extremities: No LE Edema, 1+ distal pulses LLE, no groin hematoma  has good right radial pulse, and hand is warm and well perfused   Neuro: AOx3, PERRL, EOMI, facial symmetrical, fluent speech, motor 5/5 throughout, no PND, sensation in tact      LABS:  Na: 140 (11-15 @ 23:42)  K: 3.6 (11-15 @ 23:42)  Cl: 109 (11-15 @ 23:42)  CO2: 21 (11-15 @ 23:42)  BUN: 16 (11-15 @ 23:42)  Cr: 1.07 (11-15 @ 23:42)  Glu: 116(11-15 @ 23:42)    Hgb: 9.5 (11-15 @ 23:41)  Hct: 28.8 (11-15 @ 23:41)  WBC: 8.55 (11-15 @ 23:41)  Plt: 183 (11-15 @ 23:41)    INR:   PTT:                          HPI:  77 year old female with PMH of Bladder Cancer, S/P TURBT, HTN, HLD, Incidentally found A[employer] Aneurysm, S/P  Endovascular Coiling 12/3/2021. 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 She denies any motor sensory speech visual abnormalities. She presented to [facility] for evaluation prior to scheduled Cerebral Angiogram and Embolization on 11/15/2022.    of note elevated blood pressure noted at PST as a result of brachiocephalic stenosis.    Currently in the ICU for Cangrelor infusion.     PAST MEDICAL & SURGICAL HISTORY:  Stenosis of brachiocephalic artery      Hypertension      History of cerebral aneurysm      Bladder cancer      COVID-19 virus infection  -dec 2021 (mild symptoms)      Torn meniscus  -left knee, getting PT      H/O cerebral aneurysm repair  coiling 12/2021      S/P ORIF (open reduction internal fixation) fracture  -right wrist 2019      Bladder cancer  -S/P TURBT 15 years ago      H/O colonoscopy      S/P coil embolization of cerebral aneurysm  -december 2021, f/u cerebral angiogram 8/2022      S/P appendectomy          24 HOUR EVENTS:   - POD1 s/p inominate artery angioplasty stenting for subclavian steal and PAUL stenting for residual ACOM aneurysm  - On Cangrelor drip          REVIEW OF SYSTEMS: [ ] Unable to Assess due to neurologic exam   [ x] All ROS addressed below are non-contributory, except:  Neuro: [ ] Headache [ ] Back pain [ ] Numbness [ ] Weakness [ ] Ataxia [ ] Dizziness [ ] Aphasia [ ] Dysarthria [ ] Visual disturbance  Resp: [ ] Shortness of breath/dyspnea, [ ] Orthopnea [ ] Cough  CV: [ ] Chest pain [ ] Palpitation [ ] Lightheadedness [ ] Syncope  Renal: [ ] Thirst [ ] Edema  GI: [ ] Nausea [ ] Emesis [ ] Abdominal pain [ ] Constipation [ ] Diarrhea  Hem: [ ] Hematemesis [ ] bright red blood per rectum  ID: [ ] Fever [ ] Chills [ ] Dysuria  ENT: [ ] Rhinorrhea        No Known Allergies    Intolerances    T(C): 36.9 (11-16-22 @ 07:00), Max: 37.2 (11-15-22 @ 23:00)  HR: 72 (11-16-22 @ 07:00) (59 - 91)  BP: 141/62 (11-16-22 @ 07:00) (103/50 - 190/93)  RR: 15 (11-16-22 @ 07:00) (12 - 20)  SpO2: 99% (11-16-22 @ 07:00) (95% - 100%)  11-15-22 @ 07:01  -  11-16-22 @ 07:00  --------------------------------------------------------  IN: 2414.8 mL / OUT: 840 mL / NET: 1574.8 mL    11-16-22 @ 07:01  -  11-16-22 @ 09:09  --------------------------------------------------------  IN: 54.4 mL / OUT: 350 mL / NET: -295.6 mL    acetaminophen     Tablet .. 650 milliGRAM(s) Oral every 6 hours PRN  ALPRAZolam 0.25 milliGRAM(s) Oral once PRN  aspirin 325 milliGRAM(s) Oral <User Schedule>  cangrelor Infusion 2 MICROgram(s)/kG/Min IV Continuous <Continuous>  clopidogrel Tablet 75 milliGRAM(s) Oral <User Schedule>  enalapril 10 milliGRAM(s) Oral daily        PHYSICAL EXAM:    General: calm  CVS: RRR  Pulm: CTAB  GI: Soft, NTND  Extremities: No LE Edema, 1+ distal pulses LLE, no groin hematoma  has good right radial pulse, and hand is warm and well perfused   Neuro: AOx3, PERRL, EOMI, facial symmetrical, fluent speech, motor 5/5 throughout, no PND, sensation in tact      LABS:  Na: 140 (11-15 @ 23:42)  K: 3.6 (11-15 @ 23:42)  Cl: 109 (11-15 @ 23:42)  CO2: 21 (11-15 @ 23:42)  BUN: 16 (11-15 @ 23:42)  Cr: 1.07 (11-15 @ 23:42)  Glu: 116(11-15 @ 23:42)    Hgb: 9.5 (11-15 @ 23:41)  Hct: 28.8 (11-15 @ 23:41)  WBC: 8.55 (11-15 @ 23:41)  Plt: 183 (11-15 @ 23:41)    INR:   PTT:

## 2022-11-16 NOTE — PROGRESS NOTE ADULT - ASSESSMENT
Brachiocephalic stenosis  s/p stent  fu with nsx  [facility]    HTN  cont current meds  monitor b/l arm pressure once possible  plan for MRA as per [facility]    Aortic stenosis  mod to severe   repeat echo in 6 months      Advanced care planning was discussed with patient and family.  Risks, benefits and alternatives of the cardiac treatments and medical therapy including procedures were discussed in detail and all questions were answered. Importance of compliance with medical therapy and lifestyle modification to improve cardiovascular health were addressed. Appropriate forms and patient educational materials were reviewed. 30 minutes face to face spent.

## 2022-11-17 VITALS
RESPIRATION RATE: 21 BRPM | OXYGEN SATURATION: 95 % | SYSTOLIC BLOOD PRESSURE: 111 MMHG | HEART RATE: 79 BPM | DIASTOLIC BLOOD PRESSURE: 54 MMHG

## 2022-11-17 LAB
ALBUMIN SERPL ELPH-MCNC: 3.4 G/DL — SIGNIFICANT CHANGE UP (ref 3.3–5)
ALP SERPL-CCNC: 68 U/L — SIGNIFICANT CHANGE UP (ref 40–120)
ALT FLD-CCNC: 12 U/L — SIGNIFICANT CHANGE UP (ref 10–45)
ANION GAP SERPL CALC-SCNC: 11 MMOL/L — SIGNIFICANT CHANGE UP (ref 5–17)
AST SERPL-CCNC: 13 U/L — SIGNIFICANT CHANGE UP (ref 10–40)
BILIRUB DIRECT SERPL-MCNC: 0.1 MG/DL — SIGNIFICANT CHANGE UP (ref 0–0.3)
BILIRUB INDIRECT FLD-MCNC: 0.3 MG/DL — SIGNIFICANT CHANGE UP (ref 0.2–1)
BILIRUB SERPL-MCNC: 0.4 MG/DL — SIGNIFICANT CHANGE UP (ref 0.2–1.2)
BUN SERPL-MCNC: 10 MG/DL — SIGNIFICANT CHANGE UP (ref 7–23)
CALCIUM SERPL-MCNC: 8.6 MG/DL — SIGNIFICANT CHANGE UP (ref 8.4–10.5)
CHLORIDE SERPL-SCNC: 105 MMOL/L — SIGNIFICANT CHANGE UP (ref 96–108)
CO2 SERPL-SCNC: 19 MMOL/L — LOW (ref 22–31)
CREAT SERPL-MCNC: 0.74 MG/DL — SIGNIFICANT CHANGE UP (ref 0.5–1.3)
EGFR: 83 ML/MIN/1.73M2 — SIGNIFICANT CHANGE UP
GLUCOSE SERPL-MCNC: 140 MG/DL — HIGH (ref 70–99)
HCT VFR BLD CALC: 33 % — LOW (ref 34.5–45)
HGB BLD-MCNC: 10.9 G/DL — LOW (ref 11.5–15.5)
MAGNESIUM SERPL-MCNC: 2 MG/DL — SIGNIFICANT CHANGE UP (ref 1.6–2.6)
MCHC RBC-ENTMCNC: 30.2 PG — SIGNIFICANT CHANGE UP (ref 27–34)
MCHC RBC-ENTMCNC: 33 GM/DL — SIGNIFICANT CHANGE UP (ref 32–36)
MCV RBC AUTO: 91.4 FL — SIGNIFICANT CHANGE UP (ref 80–100)
NRBC # BLD: 0 /100 WBCS — SIGNIFICANT CHANGE UP (ref 0–0)
PA ADP PRP-ACNC: 152 PRU — LOW (ref 194–417)
PA ADP PRP-ACNC: 98 PRU — LOW (ref 194–417)
PLATELET # BLD AUTO: 184 K/UL — SIGNIFICANT CHANGE UP (ref 150–400)
POTASSIUM SERPL-MCNC: 3.9 MMOL/L — SIGNIFICANT CHANGE UP (ref 3.5–5.3)
POTASSIUM SERPL-SCNC: 3.9 MMOL/L — SIGNIFICANT CHANGE UP (ref 3.5–5.3)
PROT SERPL-MCNC: 5.8 G/DL — LOW (ref 6–8.3)
RBC # BLD: 3.61 M/UL — LOW (ref 3.8–5.2)
RBC # FLD: 12.9 % — SIGNIFICANT CHANGE UP (ref 10.3–14.5)
SODIUM SERPL-SCNC: 135 MMOL/L — SIGNIFICANT CHANGE UP (ref 135–145)
TROPONIN T, HIGH SENSITIVITY RESULT: 16 NG/L — SIGNIFICANT CHANGE UP (ref 0–51)
WBC # BLD: 11.66 K/UL — HIGH (ref 3.8–10.5)
WBC # FLD AUTO: 11.66 K/UL — HIGH (ref 3.8–10.5)

## 2022-11-17 PROCEDURE — C1769: CPT

## 2022-11-17 PROCEDURE — 82803 BLOOD GASES ANY COMBINATION: CPT

## 2022-11-17 PROCEDURE — 82565 ASSAY OF CREATININE: CPT

## 2022-11-17 PROCEDURE — 36415 COLL VENOUS BLD VENIPUNCTURE: CPT

## 2022-11-17 PROCEDURE — 70547 MR ANGIOGRAPHY NECK W/O DYE: CPT

## 2022-11-17 PROCEDURE — 84100 ASSAY OF PHOSPHORUS: CPT

## 2022-11-17 PROCEDURE — C1894: CPT

## 2022-11-17 PROCEDURE — 85018 HEMOGLOBIN: CPT

## 2022-11-17 PROCEDURE — C1889: CPT

## 2022-11-17 PROCEDURE — 80048 BASIC METABOLIC PNL TOTAL CA: CPT

## 2022-11-17 PROCEDURE — 84484 ASSAY OF TROPONIN QUANT: CPT

## 2022-11-17 PROCEDURE — 70551 MRI BRAIN STEM W/O DYE: CPT

## 2022-11-17 PROCEDURE — 70544 MR ANGIOGRAPHY HEAD W/O DYE: CPT

## 2022-11-17 PROCEDURE — 82330 ASSAY OF CALCIUM: CPT

## 2022-11-17 PROCEDURE — C1876: CPT

## 2022-11-17 PROCEDURE — 75894 X-RAYS TRANSCATH THERAPY: CPT

## 2022-11-17 PROCEDURE — 84295 ASSAY OF SERUM SODIUM: CPT

## 2022-11-17 PROCEDURE — 75898 FOLLOW-UP ANGIOGRAPHY: CPT

## 2022-11-17 PROCEDURE — 97161 PT EVAL LOW COMPLEX 20 MIN: CPT

## 2022-11-17 PROCEDURE — 83605 ASSAY OF LACTIC ACID: CPT

## 2022-11-17 PROCEDURE — 97165 OT EVAL LOW COMPLEX 30 MIN: CPT

## 2022-11-17 PROCEDURE — 61624 TCAT PERM OCCLS/EMBOLJ CNS: CPT

## 2022-11-17 PROCEDURE — 82435 ASSAY OF BLOOD CHLORIDE: CPT

## 2022-11-17 PROCEDURE — 36224 PLACE CATH CAROTD ART: CPT

## 2022-11-17 PROCEDURE — 99233 SBSQ HOSP IP/OBS HIGH 50: CPT

## 2022-11-17 PROCEDURE — C9460: CPT

## 2022-11-17 PROCEDURE — 85576 BLOOD PLATELET AGGREGATION: CPT

## 2022-11-17 PROCEDURE — 80076 HEPATIC FUNCTION PANEL: CPT

## 2022-11-17 PROCEDURE — 83735 ASSAY OF MAGNESIUM: CPT

## 2022-11-17 PROCEDURE — 84132 ASSAY OF SERUM POTASSIUM: CPT

## 2022-11-17 PROCEDURE — 36228 PLACE CATH INTRACRANIAL ART: CPT

## 2022-11-17 PROCEDURE — C1887: CPT

## 2022-11-17 PROCEDURE — C1760: CPT

## 2022-11-17 PROCEDURE — 82947 ASSAY GLUCOSE BLOOD QUANT: CPT

## 2022-11-17 PROCEDURE — 85027 COMPLETE CBC AUTOMATED: CPT

## 2022-11-17 PROCEDURE — 85014 HEMATOCRIT: CPT

## 2022-11-17 PROCEDURE — C2628: CPT

## 2022-11-17 PROCEDURE — 93970 EXTREMITY STUDY: CPT

## 2022-11-17 PROCEDURE — 93005 ELECTROCARDIOGRAM TRACING: CPT

## 2022-11-17 RX ORDER — TICAGRELOR 90 MG/1
1 TABLET ORAL
Qty: 30 | Refills: 0
Start: 2022-11-17

## 2022-11-17 RX ORDER — ASPIRIN/CALCIUM CARB/MAGNESIUM 324 MG
1 TABLET ORAL
Qty: 30 | Refills: 0
Start: 2022-11-17

## 2022-11-17 RX ORDER — TICAGRELOR 90 MG/1
1 TABLET ORAL
Qty: 60 | Refills: 0
Start: 2022-11-17 | End: 2022-12-16

## 2022-11-17 RX ORDER — TICAGRELOR 90 MG/1
1 TABLET ORAL
Qty: 60 | Refills: 0
Start: 2022-11-17

## 2022-11-17 RX ORDER — EZETIMIBE 10 MG/1
10 TABLET ORAL
Qty: 0 | Refills: 0 | DISCHARGE

## 2022-11-17 RX ORDER — TICAGRELOR 90 MG/1
1 TABLET ORAL
Qty: 0 | Refills: 0 | DISCHARGE

## 2022-11-17 RX ORDER — TICAGRELOR 90 MG/1
60 TABLET ORAL EVERY 12 HOURS
Refills: 0 | Status: DISCONTINUED | OUTPATIENT
Start: 2022-11-17 | End: 2022-11-17

## 2022-11-17 RX ORDER — OMEPRAZOLE 10 MG/1
1 CAPSULE, DELAYED RELEASE ORAL
Qty: 30 | Refills: 0
Start: 2022-11-17 | End: 2022-12-16

## 2022-11-17 RX ORDER — FAMOTIDINE 10 MG/ML
20 INJECTION INTRAVENOUS DAILY
Refills: 0 | Status: DISCONTINUED | OUTPATIENT
Start: 2022-11-17 | End: 2022-11-17

## 2022-11-17 RX ORDER — EZETIMIBE 10 MG/1
1 TABLET ORAL
Qty: 30 | Refills: 0
Start: 2022-11-17 | End: 2022-12-16

## 2022-11-17 RX ORDER — ASPIRIN/CALCIUM CARB/MAGNESIUM 324 MG
1 TABLET ORAL
Qty: 0 | Refills: 0 | DISCHARGE

## 2022-11-17 RX ORDER — METOCLOPRAMIDE HCL 10 MG
10 TABLET ORAL ONCE
Refills: 0 | Status: COMPLETED | OUTPATIENT
Start: 2022-11-17 | End: 2022-11-17

## 2022-11-17 RX ORDER — ONDANSETRON 8 MG/1
4 TABLET, FILM COATED ORAL ONCE
Refills: 0 | Status: COMPLETED | OUTPATIENT
Start: 2022-11-17 | End: 2022-11-17

## 2022-11-17 RX ORDER — FAMOTIDINE 10 MG/ML
1 INJECTION INTRAVENOUS
Qty: 0 | Refills: 0 | DISCHARGE
Start: 2022-11-17

## 2022-11-17 RX ORDER — HYDRALAZINE HCL 50 MG
10 TABLET ORAL ONCE
Refills: 0 | Status: COMPLETED | OUTPATIENT
Start: 2022-11-17 | End: 2022-11-17

## 2022-11-17 RX ORDER — OMEPRAZOLE 10 MG/1
1 CAPSULE, DELAYED RELEASE ORAL
Qty: 0 | Refills: 0 | DISCHARGE

## 2022-11-17 RX ADMIN — CHLORHEXIDINE GLUCONATE 1 APPLICATION(S): 213 SOLUTION TOPICAL at 12:07

## 2022-11-17 RX ADMIN — Medication 650 MILLIGRAM(S): at 15:28

## 2022-11-17 RX ADMIN — TICAGRELOR 60 MILLIGRAM(S): 90 TABLET ORAL at 17:03

## 2022-11-17 RX ADMIN — Medication 650 MILLIGRAM(S): at 16:06

## 2022-11-17 RX ADMIN — HEPARIN SODIUM 5000 UNIT(S): 5000 INJECTION INTRAVENOUS; SUBCUTANEOUS at 17:03

## 2022-11-17 RX ADMIN — PANTOPRAZOLE SODIUM 40 MILLIGRAM(S): 20 TABLET, DELAYED RELEASE ORAL at 06:11

## 2022-11-17 RX ADMIN — HEPARIN SODIUM 5000 UNIT(S): 5000 INJECTION INTRAVENOUS; SUBCUTANEOUS at 06:09

## 2022-11-17 RX ADMIN — ONDANSETRON 4 MILLIGRAM(S): 8 TABLET, FILM COATED ORAL at 01:40

## 2022-11-17 RX ADMIN — Medication 325 MILLIGRAM(S): at 06:06

## 2022-11-17 RX ADMIN — Medication 10 MILLIGRAM(S): at 06:07

## 2022-11-17 RX ADMIN — FAMOTIDINE 20 MILLIGRAM(S): 10 INJECTION INTRAVENOUS at 12:06

## 2022-11-17 RX ADMIN — Medication 10 MILLIGRAM(S): at 03:38

## 2022-11-17 RX ADMIN — Medication 40 MILLIEQUIVALENT(S): at 06:06

## 2022-11-17 RX ADMIN — Medication 10 MILLIGRAM(S): at 03:37

## 2022-11-17 NOTE — PROGRESS NOTE ADULT - SUBJECTIVE AND OBJECTIVE BOX
HPI:  77 year old female with PMH of Bladder Cancer, S/P TURBT, HTN, HLD, Incidentally found ACOMM Aneurysm, S/P  Endovascular Coiling 12/3/2021. 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 She denies any motor sensory speech visual abnormalities. She presents to [facility] today for evaluation prior to scheduled Cerebral Angiogram and Embolization  on 11/15/2022.    of note elevated blood pressure noted at PST as a result of brachiocephalic stenosis.    preop covid swab 11/11 @ [facility] (11 Nov 2022 10:06)          24 HOUR EVENTS:   - vomiting overnight  - on cangelor, pending plan per nsg likely transition to [facility]            ICU Vital Signs Last 24 Hrs  T(C): 36.8 (17 Nov 2022 12:00), Max: 37 (16 Nov 2022 23:00)  T(F): 98.2 (17 Nov 2022 12:00), Max: 98.6 (16 Nov 2022 23:00)  HR: 81 (17 Nov 2022 12:00) (66 - 97)  BP: 113/58 (17 Nov 2022 12:00) (97/50 - 194/76)  BP(mean): 81 (17 Nov 2022 12:00) (66 - 116)  ABP: 89/68 (17 Nov 2022 12:00) (89/68 - 206/95)  ABP(mean): 78 (17 Nov 2022 12:00) (73 - 133)  RR: 19 (17 Nov 2022 12:00) (14 - 33)  SpO2: 97% (17 Nov 2022 12:00) (92% - 99%)    O2 Parameters below as of 17 Nov 2022 08:00  Patient On (Oxygen Delivery Method): room air           11-16 @ 07:01  -  11-17 @ 07:00  --------------------------------------------------------  IN: 2560 mL / OUT: 2350 mL / NET: 210 mL    11-17 @ 07:01  -  11-17 @ 12:25  --------------------------------------------------------  IN: 457.6 mL / OUT: 300 mL / NET: 157.6 mL                             10.9   11.66 )-----------( 184      ( 17 Nov 2022 07:07 )             33.0    11-17    135  |  105  |  10  ----------------------------<  140<H>  3.9   |  19<L>  |  0.74    Ca    8.6      17 Nov 2022 07:04  Phos  3.6     11-16  Mg     2.0     11-17     ABG - ( 15 Nov 2022 13:21 )  pH, Arterial: 7.27  pH, Blood: x     /  pCO2: 46    /  pO2: 191   / HCO3: 21    / Base Excess: -5.6  /  SaO2: 99.5                       MEDICATIONS:  acetaminophen     Tablet .. 650 milliGRAM(s) Oral every 6 hours PRN  aspirin 325 milliGRAM(s) Oral <User Schedule>  cangrelor Infusion 2 MICROgram(s)/kG/Min IV Continuous <Continuous>  chlorhexidine 4% Liquid 1 Application(s) Topical daily  enalapril 10 milliGRAM(s) Oral daily  famotidine    Tablet 20 milliGRAM(s) Oral daily  heparin   Injectable 5000 Unit(s) SubCutaneous every 12 hours  pantoprazole    Tablet 40 milliGRAM(s) Oral before breakfast            PHYSICAL EXAM:    General: calm  CVS: RRR  Pulm: CTAB  GI: Soft, NTND  Extremities: No LE Edema  Neuro: AOx3, PERRL, EOMI, facial symmetrical, fluent speech, motor 5/5 throughout, no PND, sensation in tact                 HPI:  77 year old female with PMH of Bladder Cancer, S/P TURBT, HTN, HLD, Incidentally found ACOMM Aneurysm, S/P  Endovascular Coiling 12/3/2021. 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 She denies any motor sensory speech visual abnormalities. She presents to [facility] today for evaluation prior to scheduled Cerebral Angiogram and Embolization  on 11/15/2022.    of note elevated blood pressure noted at PST as a result of brachiocephalic stenosis.    preop covid swab 11/11 @ [facility] (11 Nov 2022 10:06)      PAST MEDICAL & SURGICAL HISTORY:  Stenosis of brachiocephalic artery      Hypertension      History of cerebral aneurysm      Bladder cancer      COVID-19 virus infection  -dec 2021 (mild symptoms)      Torn meniscus  -left knee, getting PT      H/O cerebral aneurysm repair  coiling 12/2021      S/P ORIF (open reduction internal fixation) fracture  -right wrist 2019      Bladder cancer  -S/P TURBT 15 years ago      H/O colonoscopy      S/P coil embolization of cerebral aneurysm  -december 2021, f/u cerebral angiogram 8/2022      S/P appendectomy          24 HOUR EVENTS:   - vomiting overnight  - on cangelor, pending plan per nsg likely transition to brillinta            Allergies    No Known Allergies    Intolerances    statins (Other)  ICU Vital Signs Last 24 Hrs  T(C): 36.8 (17 Nov 2022 12:00), Max: 37 (16 Nov 2022 23:00)  T(F): 98.2 (17 Nov 2022 12:00), Max: 98.6 (16 Nov 2022 23:00)  HR: 81 (17 Nov 2022 12:00) (66 - 97)          REVIEW OF SYSTEMS: [ ] Unable to Assess due to neurologic exam   [ x] All ROS addressed below are non-contributory, except:  Neuro: [ ] Headache [ ] Back pain [ ] Numbness [ ] Weakness [ ] Ataxia [ ] Dizziness [ ] Aphasia [ ] Dysarthria [ ] Visual disturbance  Resp: [ ] Shortness of breath/dyspnea, [ ] Orthopnea [ ] Cough  CV: [ ] Chest pain [ ] Palpitation [ ] Lightheadedness [ ] Syncope  Renal: [ ] Thirst [ ] Edema  GI: [x ] Nausea [x ] Emesis [ ] Abdominal pain [ ] Constipation [ ] Diarrhea  Hem: [ ] Hematemesis [ ] bright red blood per rectum  ID: [ ] Fever [ ] Chills [ ] Dysuria  ENT: [ ] Rhinorrhea    T(C): 36.8 (11-17-22 @ 12:00), Max: 37 (11-16-22 @ 23:00)  HR: 77 (11-17-22 @ 13:00) (69 - 97)  BP: 109/55 (11-17-22 @ 13:00) (97/50 - 194/76)  RR: 17 (11-17-22 @ 13:00) (14 - 33)  SpO2: 95% (11-17-22 @ 13:00) (92% - 98%)  11-16-22 @ 07:01  -  11-17-22 @ 07:00  --------------------------------------------------------  IN: 2560 mL / OUT: 2350 mL / NET: 210 mL    11-17-22 @ 07:01  -  11-17-22 @ 13:33  --------------------------------------------------------  IN: 512 mL / OUT: 600 mL / NET: -88 mL    acetaminophen     Tablet .. 650 milliGRAM(s) Oral every 6 hours PRN  aspirin 325 milliGRAM(s) Oral <User Schedule>  cangrelor Infusion 2 MICROgram(s)/kG/Min IV Continuous <Continuous>  chlorhexidine 4% Liquid 1 Application(s) Topical daily  enalapril 10 milliGRAM(s) Oral daily  famotidine    Tablet 20 milliGRAM(s) Oral daily  heparin   Injectable 5000 Unit(s) SubCutaneous every 12 hours  pantoprazole    Tablet 40 milliGRAM(s) Oral before breakfast                             10.9   11.66 )-----------( 184      ( 17 Nov 2022 07:07 )             33.0    11-17    135  |  105  |  10  ----------------------------<  140<H>  3.9   |  19<L>  |  0.74    Ca    8.6      17 Nov 2022 07:04  Phos  3.6     11-16  Mg     2.0     11-17     ABG - ( 15 Nov 2022 13:21 )  pH, Arterial: 7.27  pH, Blood: x     /  pCO2: 46    /  pO2: 191   / HCO3: 21    / Base Excess: -5.6  /  SaO2: 99.5                       MEDICATIONS:  acetaminophen     Tablet .. 650 milliGRAM(s) Oral every 6 hours PRN  aspirin 325 milliGRAM(s) Oral <User Schedule>  cangrelor Infusion 2 MICROgram(s)/kG/Min IV Continuous <Continuous>  chlorhexidine 4% Liquid 1 Application(s) Topical daily  enalapril 10 milliGRAM(s) Oral daily  famotidine    Tablet 20 milliGRAM(s) Oral daily  heparin   Injectable 5000 Unit(s) SubCutaneous every 12 hours  pantoprazole    Tablet 40 milliGRAM(s) Oral before breakfast            PHYSICAL EXAM:    General: calm  CVS: RRR  Pulm: CTAB  GI: Soft, NTND  Extremities: No LE Edema, no grin hematoma, right radial pulse +2, hands well perfused   Neuro: AOx3, PERRL, EOMI, facial symmetrical, fluent speech, motor 5/5 throughout, no PND, sensation in tact          LABS:  Na: 135 (11-17 @ 07:04), 139 (11-16 @ 21:34), 141 (11-16 @ 14:44), 140 (11-15 @ 23:42)  K: 3.9 (11-17 @ 07:04), 4.2 (11-16 @ 21:34), 4.1 (11-16 @ 14:44), 3.6 (11-15 @ 23:42)  Cl: 105 (11-17 @ 07:04), 110 (11-16 @ 21:34), 111 (11-16 @ 14:44), 109 (11-15 @ 23:42)  CO2: 19 (11-17 @ 07:04), 21 (11-16 @ 21:34), 20 (11-16 @ 14:44), 21 (11-15 @ 23:42)  BUN: 10 (11-17 @ 07:04), 10 (11-16 @ 21:34), 12 (11-16 @ 14:44), 16 (11-15 @ 23:42)  Cr: 0.74 (11-17 @ 07:04), 0.86 (11-16 @ 21:34), 0.83 (11-16 @ 14:44), 1.07 (11-15 @ 23:42)  Glu: 140(11-17 @ 07:04), 101(11-16 @ 21:34), 108(11-16 @ 14:44), 116(11-15 @ 23:42)    Hgb: 10.9 (11-17 @ 07:07), 9.7 (11-16 @ 21:34), 9.5 (11-15 @ 23:41)  Hct: 33.0 (11-17 @ 07:07), 30.3 (11-16 @ 21:34), 28.8 (11-15 @ 23:41)  WBC: 11.66 (11-17 @ 07:07), 10.26 (11-16 @ 21:34), 8.55 (11-15 @ 23:41)  Plt: 184 (11-17 @ 07:07), 170 (11-16 @ 21:34), 183 (11-15 @ 23:41)    INR:   PTT:

## 2022-11-17 NOTE — DISCHARGE NOTE PROVIDER - CARE PROVIDER_API CALL
Meliton Berg)  Neurosurgery  [address], [address]  [place] [address]  Phone: [phone]  Fax: [phone]  Follow Up Time:

## 2022-11-17 NOTE — OCCUPATIONAL THERAPY INITIAL EVALUATION ADULT - ADDITIONAL COMMENTS
Pt lives in PH w/  +0 ASAEL and flight inside to full bathroom/bedroom. Pt has a tub shower, no AE/DME, PTA was independent with all ADLs.

## 2022-11-17 NOTE — DIETITIAN INITIAL EVALUATION ADULT - OTHER INFO
Wt Hx:   Dosing wt 90.7 kG/199.9 lbs. Daily wt 210.5 lbs (11/17).   UBW ~197 lbs and denies any changes in wt PTA.   IBW:  110 lbs  IBW%: 182%  Wt Hx per HIE (lbs): 197 (8/25/22), 199 (11/11/22)

## 2022-11-17 NOTE — PHYSICAL THERAPY INITIAL EVALUATION ADULT - DID THE PATIENT HAVE SURGERY?
s/p innominate artery angioplasty stenting for subclavian steal and PAUL stenting for residual ACOM aneurysm/yes

## 2022-11-17 NOTE — PHYSICAL THERAPY INITIAL EVALUATION ADULT - PLANNED THERAPY INTERVENTIONS, PT EVAL
GOAL: Stair Negotiation Training: Patient will be able to negotiate up & down 1 flight of stairs independently with unilateral rail, step to gait pattern, in 2 weeks./balance training/bed mobility training/gait training/strengthening/transfer training

## 2022-11-17 NOTE — PROGRESS NOTE ADULT - ASSESSMENT
ASSESSMENT/PLAN:    s/p POD 1 from  inominate artery angioplasty stenting for subclavian steal and PAUL stenting for residual ACOM aneurysm      NEURO:  - neuro checks q4h  - MR NOVA completed  - Failed ASA/Plavix trial- subtherapeutic P2Y12  - 11/16- Patient loaded again on 300 of Plavix today- patient given 180 mg of Brilinta yesterday; pending plan per NSG for brillinta and cangelor  - Activity: PT/OT as tolerated    CVS:  - SBP goal 100-160  - c/w home lisinopril; hold in s/o chelsey  - On enalapril 10 mg QD  - severe AS, cardiology recommending TTE in 6 months  - troponin, ekg for atypical chest pain and emesis    PULM:  - RA  - IS As tolerated  - Aspiration precautions    RENAL:  - Fluids: IVL  - Chelsey now resolved- likely contrast induced   - daily IOs  - monitor renal function    GI:  - Diet: Regular  - GI prophylaxis: Pepcid- home med; ppi concern for PUD  - Bowel regimen: miralax, senna  - LBM 11/16  - CMP, to r/o GB pathology    ENDO:   - FS goal 120-180    HEME/ONC:  - SCDs  - Chemoppx: SQH   - LED- negative  - monitor h/h, trending down    ID:  - monitor for fevers

## 2022-11-17 NOTE — DIETITIAN INITIAL EVALUATION ADULT - NSICDXPASTSURGICALHX_GEN_ALL_CORE_FT
PAST SURGICAL HISTORY:  Bladder cancer -S/P TURBT 15 years ago    H/O cerebral aneurysm repair coiling 12/2021    H/O colonoscopy     S/P appendectomy     S/P coil embolization of cerebral aneurysm -december 2021, f/u cerebral angiogram 8/2022    S/P ORIF (open reduction internal fixation) fracture -right wrist 2019

## 2022-11-17 NOTE — PROGRESS NOTE ADULT - SUBJECTIVE AND OBJECTIVE BOX
Patient seen and examined at bedside.    --Anticoagulation--  cangrelor Infusion 2 MICROgram(s)/kG/Min IV Continuous <Continuous>  clopidogrel Tablet 75 milliGRAM(s) Oral <User Schedule>  heparin   Injectable 5000 Unit(s) SubCutaneous every 12 hours    T(C): 36.6 (11-16-22 @ 19:00), Max: 36.9 (11-16-22 @ 07:00)  HR: 73 (11-16-22 @ 22:00) (66 - 85)  BP: 131/57 (11-16-22 @ 22:00) (103/50 - 160/70)  RR: 17 (11-16-22 @ 22:00) (12 - 22)  SpO2: 95% (11-16-22 @ 22:00) (93% - 99%)  Wt(kg): --    Exam:  AOx3, FC, PERRL, EOMI, no facial, 5/5 throughout, no drift

## 2022-11-17 NOTE — DIETITIAN INITIAL EVALUATION ADULT - PERTINENT MEDS FT
MEDICATIONS  (STANDING):  aspirin 325 milliGRAM(s) Oral <User Schedule>  cangrelor Infusion 2 MICROgram(s)/kG/Min (54.4 mL/Hr) IV Continuous <Continuous>  chlorhexidine 4% Liquid 1 Application(s) Topical daily  enalapril 10 milliGRAM(s) Oral daily  famotidine    Tablet 20 milliGRAM(s) Oral daily  heparin   Injectable 5000 Unit(s) SubCutaneous every 12 hours  pantoprazole    Tablet 40 milliGRAM(s) Oral before breakfast    MEDICATIONS  (PRN):  acetaminophen     Tablet .. 650 milliGRAM(s) Oral every 6 hours PRN Temp greater or equal to 38C (100.4F), Mild Pain (1 - 3)

## 2022-11-17 NOTE — PROGRESS NOTE ADULT - TIME BILLING
s/p inominate artery angioplasty stenting for subclavian steal and PAUL stenting for residual ACOM aneurysm POD 0   continue cangrelor drip on aspirin, groin check, pulse check, MRI NOVA tomorrow , AS on lisinopril  cardiology on consult, advance diet as tolerated
s/p POD 1 from  inominate artery angioplasty stenting for subclavian steal and PAUL stenting for residual ACOM aneurysm  on cangrelor, failed plavix, now on brelinta and aspirin per Dr Berg, has nausea and vomiting, ECG NSR, troponin neg, on pantoprazole , will send LFT, regular diet, IVL , heparin sc for chemoprophylaxis
s/p POD 1 from  inominate artery angioplasty stenting for subclavian steal and PAUL stenting for residual ACOM aneurysm   neuro checks q4h, MR NOVA today, Send ARU and PRU , if within range will d/c cangrelor, continue aspirin and plavix, PT/OT, SBP goal 100-160 mmhg has severe AS cardiology on consult, on lisinopril, monitor creat if creat increase will hold off on lisinopirl, RA , regular diet, last BM today , plasmalyte 50 ml/hr for kidney protection , chemoprophylaxis if ok with NS heparin sc, transfer to floor once off cangrelor

## 2022-11-17 NOTE — DISCHARGE NOTE PROVIDER - REASON FOR ADMISSION
Inominate artery angioplasty stenting for subclavian steal and PAUL stenting for residual ACOM aneurysm

## 2022-11-17 NOTE — DIETITIAN INITIAL EVALUATION ADULT - EDUCATION DIETARY MODIFICATIONS
RD emphasized importance of adequate intake, reviewed foods for nausea. No questions at this time. Pt made aware RD to remain available./(2) meets goals/outcomes/verbalization

## 2022-11-17 NOTE — PHYSICAL THERAPY INITIAL EVALUATION ADULT - GAIT TRAINING, PT EVAL
PF Genteal q2-3 h ou. GOAL: Patient will ambulate 300 feet independently with least restrictive AD in 2 weeks.

## 2022-11-17 NOTE — PROGRESS NOTE ADULT - SUBJECTIVE AND OBJECTIVE BOX
DATE OF SERVICE: 11-17-22 @ 08:24    Subjective: Patient seen and examined. No new events except as noted.     SUBJECTIVE/ROS:  had nausea   now resolved       MEDICATIONS:  MEDICATIONS  (STANDING):  aspirin 325 milliGRAM(s) Oral <User Schedule>  cangrelor Infusion 2 MICROgram(s)/kG/Min (54.4 mL/Hr) IV Continuous <Continuous>  chlorhexidine 4% Liquid 1 Application(s) Topical daily  enalapril 10 milliGRAM(s) Oral daily  famotidine    Tablet 20 milliGRAM(s) Oral daily  heparin   Injectable 5000 Unit(s) SubCutaneous every 12 hours  pantoprazole    Tablet 40 milliGRAM(s) Oral before breakfast      PHYSICAL EXAM:  T(C): 36.4 (11-17-22 @ 04:00), Max: 37 (11-16-22 @ 23:00)  HR: 79 (11-17-22 @ 07:00) (66 - 97)  BP: 110/54 (11-17-22 @ 07:00) (107/93 - 194/76)  RR: 18 (11-17-22 @ 07:00) (12 - 33)  SpO2: 92% (11-17-22 @ 07:00) (92% - 99%)  Wt(kg): --  I&O's Summary    16 Nov 2022 07:01  -  17 Nov 2022 07:00  --------------------------------------------------------  IN: 2560 mL / OUT: 2350 mL / NET: 210 mL            JVP: Normal  Neck: supple  Lung: clear   CV: S1 S2 , Murmur:  Abd: soft  Ext: No edema  neuro: Awake / alert  Psych: flat affect  Skin: normal``    LABS/DATA:    CARDIAC MARKERS:                                10.9   11.66 )-----------( 184      ( 17 Nov 2022 07:07 )             33.0     11-17    135  |  105  |  10  ----------------------------<  140<H>  3.9   |  19<L>  |  0.74    Ca    8.6      17 Nov 2022 07:04  Phos  3.6     11-16  Mg     2.0     11-17      proBNP:   Lipid Profile:   HgA1c:   TSH:     TELE:  EKG:

## 2022-11-17 NOTE — DISCHARGE NOTE PROVIDER - HOSPITAL COURSE
77 year old female with PMH of Bladder Cancer, S/P TURBT, HTN, HLD, Incidentally found ACOMM Aneurysm, S/P  Endovascular Coiling 12/3/2021 presented on 11/15 for a pre scheduled Angiogram w/ stenting of inominate artery and residual acomm aneurysm. Post op patients neurologic exam was intact, pt was loaded with 600 plavix and 325 Asa and started on Cangrelor @ 2 on 11/15 . ARU/PRU were drawn and subsequently non-therapeutic. 11/16 11am, a 300 plavix dose given, subsequesnt ARU/PRU was non therapeutic. Pt give 180 load of Brilinta, Cangrelor @ 2 remained on, PRU was drawn after 2h, PRU 98. NSGY recommended to leave Cangrelor on remainder of night, at 6am 11/17 pause and redraw PRU, if therapeutic, discharge home considered appropriate. Pt complains of nausea, Zofran and Reglan given w/ resolution.   During admission MRI/MR Nova obtained with result of: No flow measurable in the right A1 segment of the anterior cerebral artery related to the presence of a stent. Good intracranial flow and good flow in the right common carotid artery with a stent in the right innominate artery.   Lower extremity duplex dopplers obtained while in NSCU result: no DVT.   On the day of discharge, the patient is medically and neurologically stable for discharge to home.

## 2022-11-17 NOTE — PHYSICAL THERAPY INITIAL EVALUATION ADULT - PERTINENT HX OF CURRENT PROBLEM, REHAB EVAL
77 year old female with PMH of Bladder Cancer, S/P TURBT, HTN, HLD, Incidentally found ACOMM Aneurysm, S/P  Endovascular Coiling 12/3/2021. 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 She denies any motor sensory speech visual abnormalities. She presents to [facility] for evaluation prior to scheduled Cerebral Angiogram and Embolization on 11/15/2022. Of note elevated blood pressure noted at PST as a result of brachiocephalic stenosis. Now s/p DSA stent of a.comm & innominate artery stent.    11/16 MR Head/Angio Brain and Neck: Mild age-appropriate involutional and ischemic gliotic changes. Several small punctate infarcts in the right temporal occipital cortex. No flow measurable in the right A1 segment of the anterior cerebral artery related to the presence of a stent. Good intracranial flow and good flow in the right common carotid artery with a stent in the right innominate artery. VA Duplex LE Vein Scan: No evidence of deep venous thrombosis in either lower extremity.

## 2022-11-17 NOTE — DISCHARGE NOTE PROVIDER - NSDCMRMEDTOKEN_GEN_ALL_CORE_FT
aspirin 81 mg oral tablet: 1 tab(s) orally once a day  enalapril 10 mg oral tablet: 1 tab(s) orally 2 times a day  PriLOSEC 40 mg oral delayed release capsule: 1 cap(s) orally once a day  Zetia: 10 milligram(s) orally once a day   aspirin 325 mg oral tablet: 1 tab(s) orally once a day   enalapril 20 mg oral tablet: 1 tab(s) orally once a day  ezetimibe 10 mg oral tablet: 1 tab(s) orally once a day  famotidine 20 mg oral tablet: 1 tab(s) orally once a day  omeprazole 40 mg oral delayed release capsule: 1 cap(s) orally once a day  ticagrelor 60 mg oral tablet: 1 tab(s) orally 2 times a day MDD:2

## 2022-11-17 NOTE — DISCHARGE NOTE PROVIDER - NSDCCPTREATMENT_GEN_ALL_CORE_FT
PRINCIPAL PROCEDURE  Procedure: Angioplasty of cerebral artery with insertion of stent  Findings and Treatment:       SECONDARY PROCEDURE  Procedure: Insertion, embolization coil or endovascular flow-diverting stent, or both, for intracranial aneurysm  Findings and Treatment:

## 2022-11-17 NOTE — DISCHARGE NOTE PROVIDER - NSDCCPCAREPLAN_GEN_ALL_CORE_FT
PRINCIPAL DISCHARGE DIAGNOSIS  Diagnosis: Cerebral aneurysm  Assessment and Plan of Treatment: On 11/15 you had your Cerebral Angiogram with inominate artery stenting as well as Anterior communicating artery PAUL stent.   You have been started on dual anti platelet therapy for treatment of aneurysm/stents. Continue taking **  You may take Tylenol as needed for pain   Please make all necessary appointments and follow up. Please DO NOT take any Aspirin and NSAIDs (Advil, Aleve, Motrin, Ibuprofen) until cleared by your Neurosurgeon. Please DO NOT do any heavy lifting, bending, twisting and straining. You may shower, but NO SOAP / NO SHAMPOO. DO NOT do any scrubbing. Pat dry only. Please come to the emergency room for any of the following: altered mental status, seizures, pain uncontrolled by pain medications, fevers, leaking / bleeding from groin surgical site, chest pain and shortness of breath.  PLEASE make an appointment for follow up with neurosurgeon Dr. Meliton Berg. Call [phone] to schedule an appointment.      SECONDARY DISCHARGE DIAGNOSES  Diagnosis: Subclavian steal syndrome  Assessment and Plan of Treatment:

## 2022-11-17 NOTE — OCCUPATIONAL THERAPY INITIAL EVALUATION ADULT - PERTINENT HX OF CURRENT PROBLEM, REHAB EVAL
77 year old female with PMH of Bladder Cancer, S/P TURBT, HTN, HLD, Incidentally found ACOMM Aneurysm, S/P  Endovascular Coiling 12/3/2021. 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 She denies any motor sensory speech visual abnormalities. She presents to PST today for evaluation prior to scheduled Cerebral Angiogram and Embolization  on 11/15/2022.    of note elevated blood pressure noted at PST as a result of brachiocephalic stenosis.

## 2022-11-17 NOTE — DIETITIAN INITIAL EVALUATION ADULT - PERTINENT LABORATORY DATA
11-17    135  |  105  |  10  ----------------------------<  140<H>  3.9   |  19<L>  |  0.74    Ca    8.6      17 Nov 2022 07:04  Phos  3.6     11-16  Mg     2.0     11-17

## 2022-11-17 NOTE — PHYSICAL THERAPY INITIAL EVALUATION ADULT - ADDITIONAL COMMENTS
Pt lives in a private house with her  with 0 steps to enter and 12 stairs inside. Prior to  hospitalization patient was independent in all ADL's and ambulation without an assistive device.

## 2022-11-17 NOTE — PROGRESS NOTE ADULT - ASSESSMENT
Brachiocephalic stenosis  s/p stent  fu with nsx    HTN  cont current meds    Aortic stenosis  mod to severe   repeat echo in 6 months

## 2022-12-06 PROBLEM — U07.1 COVID-19: Chronic | Status: ACTIVE | Noted: 2022-11-11

## 2022-12-06 PROBLEM — S83.209A UNSPECIFIED TEAR OF UNSPECIFIED MENISCUS, CURRENT INJURY, UNSPECIFIED KNEE, INITIAL ENCOUNTER: Chronic | Status: ACTIVE | Noted: 2022-11-11

## 2022-12-13 NOTE — ASU DISCHARGE PLAN (ADULT/PEDIATRIC) - DRIVING DURATION DAY(S)
no driving for now cleared to resume 9/1/2022 Simple: Patient demonstrates quick and easy understanding/Verbalized Understanding

## 2022-12-14 RX ORDER — TICAGRELOR 60 MG/1
60 TABLET ORAL TWICE DAILY
Qty: 60 | Refills: 8 | Status: ACTIVE | COMMUNITY
Start: 2022-12-14 | End: 1900-01-01

## 2022-12-22 ENCOUNTER — APPOINTMENT (OUTPATIENT)
Dept: NEUROSURGERY | Facility: CLINIC | Age: 77
End: 2022-12-22

## 2022-12-22 VITALS
HEIGHT: 63 IN | HEART RATE: 59 BPM | DIASTOLIC BLOOD PRESSURE: 73 MMHG | WEIGHT: 200 LBS | BODY MASS INDEX: 35.44 KG/M2 | SYSTOLIC BLOOD PRESSURE: 162 MMHG | OXYGEN SATURATION: 96 %

## 2022-12-22 PROCEDURE — 99212 OFFICE O/P EST SF 10 MIN: CPT | Mod: 24

## 2022-12-23 NOTE — PHYSICAL EXAM
[General Appearance - Alert] : alert [General Appearance - In No Acute Distress] : in no acute distress [Person] : oriented to person [Place] : oriented to place [Time] : oriented to time [Motor Strength] : muscle strength was normal in all four extremities [Abnormal Walk] : normal gait

## 2022-12-23 NOTE — REASON FOR VISIT
[Follow-Up: _____] : a [unfilled] follow-up visit [FreeTextEntry1] : Zuleyma Rosario is here for a follow up visit after having cerebral angiogram and embolization of aneurysm. SHe has chief complaint of upper back pain, it gets better with massage and heat. No new motor sensory speech visual abnormalities.

## 2022-12-23 NOTE — ASSESSMENT
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

## 2023-03-08 ENCOUNTER — APPOINTMENT (OUTPATIENT)
Dept: MRI IMAGING | Facility: HOSPITAL | Age: 78
End: 2023-03-08

## 2023-03-08 ENCOUNTER — OUTPATIENT (OUTPATIENT)
Dept: OUTPATIENT SERVICES | Facility: HOSPITAL | Age: 78
LOS: 1 days | End: 2023-03-08
Payer: MEDICARE

## 2023-03-08 DIAGNOSIS — Z98.890 OTHER SPECIFIED POSTPROCEDURAL STATES: Chronic | ICD-10-CM

## 2023-03-08 DIAGNOSIS — C67.9 MALIGNANT NEOPLASM OF BLADDER, UNSPECIFIED: Chronic | ICD-10-CM

## 2023-03-08 DIAGNOSIS — Z90.49 ACQUIRED ABSENCE OF OTHER SPECIFIED PARTS OF DIGESTIVE TRACT: Chronic | ICD-10-CM

## 2023-03-08 DIAGNOSIS — I67.1 CEREBRAL ANEURYSM, NONRUPTURED: ICD-10-CM

## 2023-03-08 PROCEDURE — 70544 MR ANGIOGRAPHY HEAD W/O DYE: CPT | Mod: 26,MH

## 2023-03-08 PROCEDURE — 70544 MR ANGIOGRAPHY HEAD W/O DYE: CPT

## 2023-04-13 ENCOUNTER — APPOINTMENT (OUTPATIENT)
Dept: NEUROSURGERY | Facility: CLINIC | Age: 78
End: 2023-04-13
Payer: MEDICARE

## 2023-04-13 ENCOUNTER — NON-APPOINTMENT (OUTPATIENT)
Age: 78
End: 2023-04-13

## 2023-04-13 VITALS
OXYGEN SATURATION: 95 % | SYSTOLIC BLOOD PRESSURE: 142 MMHG | HEIGHT: 63 IN | WEIGHT: 192 LBS | HEART RATE: 64 BPM | DIASTOLIC BLOOD PRESSURE: 82 MMHG | BODY MASS INDEX: 34.02 KG/M2

## 2023-04-13 DIAGNOSIS — I67.1 CEREBRAL ANEURYSM, NONRUPTURED: ICD-10-CM

## 2023-04-13 PROCEDURE — 99212 OFFICE O/P EST SF 10 MIN: CPT

## 2023-04-21 PROBLEM — I67.1 BRAIN ANEURYSM: Status: ACTIVE | Noted: 2022-04-07

## 2023-04-21 NOTE — RESULTS/DATA
[FreeTextEntry1] : PROCEDURE DATE: 03/08/2023\par \par \par \par INTERPRETATION: Clinical indication: Prior stent was placed in innominate and PAUL X stent in right MARY for treatment of residual ACOM artery aneurysm, follow-up\par \par \par 2-D axial noncontrast MRA were performed on the cervical and intracranial vessels, respectively. Intravascular flow quantification was performed using gated 2D phase contrast MR, imaged perpendicular to the vessel axis. Images were post processed NOVA software and a NOVA flow study report is available.\par \par \par There is signal dropout in the A1 and proximal A2 segments of the right anterior cerebral artery related to the presence of a stent within the adjacent coil mass. There is good intracranial flow using noninvasive flow MR angiography without significant change since the prior examination.\par \par VINICIO 189, RMCA 144, RACA2\par compared with 11/116/2022\par VINICIO 438, RMCA 111, RACA2 62\par \par LICA 303, LMCA 217, LACA 86, LACA2 41\par compared with 11/16/2022\par LICA 232, LMCA 92, LACA 104, LACA2 63\par \par RVA 10, , , RPCA 75, LPCA 62\par compared with 11/16/2022\par RVA 11, , , RPCA 56, LPCA 55\par \par IMPRESSION: Noninvasive flow MR angiography without significant change since 11/16/2022. Stent in the right A1 and A2 segments of the anterior cerebral artery with anterior communicating artery aneurysm coil mass.\par \par --- End of Report ---\par \par \par \par \par \par ELIDA PASCAL MD; Attending Radiologist

## 2023-04-21 NOTE — PHYSICAL EXAM
[General Appearance - Alert] : alert [General Appearance - In No Acute Distress] : in no acute distress [Person] : oriented to person [Place] : oriented to place [Motor Strength] : muscle strength was normal in all four extremities [Time] : oriented to time

## 2023-04-21 NOTE — REASON FOR VISIT
[Follow-Up: _____] : a [unfilled] follow-up visit [Spouse] : spouse [FreeTextEntry1] : Zuleyma is here for a  follow up visit after having new mra brain nova done. Today she feels well denies any focal motor sensory speech visual abnormalities. She does have generalized weakness and can not walk as far as she used to.

## 2023-04-21 NOTE — ASSESSMENT
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  They request that we proceed.\par Discussed will remain on aspirin and brilinta for 6months. \par

## 2023-06-06 ENCOUNTER — OUTPATIENT (OUTPATIENT)
Dept: OUTPATIENT SERVICES | Facility: HOSPITAL | Age: 78
LOS: 1 days | End: 2023-06-06
Payer: MEDICARE

## 2023-06-06 ENCOUNTER — TRANSCRIPTION ENCOUNTER (OUTPATIENT)
Age: 78
End: 2023-06-06

## 2023-06-06 ENCOUNTER — APPOINTMENT (OUTPATIENT)
Dept: NEUROSURGERY | Facility: HOSPITAL | Age: 78
End: 2023-06-06

## 2023-06-06 VITALS
HEIGHT: 61 IN | TEMPERATURE: 99 F | WEIGHT: 190.92 LBS | SYSTOLIC BLOOD PRESSURE: 143 MMHG | OXYGEN SATURATION: 97 % | DIASTOLIC BLOOD PRESSURE: 87 MMHG

## 2023-06-06 VITALS
SYSTOLIC BLOOD PRESSURE: 101 MMHG | OXYGEN SATURATION: 96 % | HEART RATE: 56 BPM | DIASTOLIC BLOOD PRESSURE: 61 MMHG | RESPIRATION RATE: 12 BRPM

## 2023-06-06 DIAGNOSIS — C67.9 MALIGNANT NEOPLASM OF BLADDER, UNSPECIFIED: Chronic | ICD-10-CM

## 2023-06-06 DIAGNOSIS — Z98.890 OTHER SPECIFIED POSTPROCEDURAL STATES: Chronic | ICD-10-CM

## 2023-06-06 DIAGNOSIS — Z09 ENCOUNTER FOR FOLLOW-UP EXAMINATION AFTER COMPLETED TREATMENT FOR CONDITIONS OTHER THAN MALIGNANT NEOPLASM: ICD-10-CM

## 2023-06-06 DIAGNOSIS — I67.1 CEREBRAL ANEURYSM, NONRUPTURED: ICD-10-CM

## 2023-06-06 DIAGNOSIS — Z90.49 ACQUIRED ABSENCE OF OTHER SPECIFIED PARTS OF DIGESTIVE TRACT: Chronic | ICD-10-CM

## 2023-06-06 LAB
PA ADP PRP-ACNC: 219 PRU — SIGNIFICANT CHANGE UP (ref 194–417)
PLATELET RESPONSE ASPIRIN RESULT: 219 ARU — LOW (ref 350–700)

## 2023-06-06 PROCEDURE — C1760: CPT

## 2023-06-06 PROCEDURE — C1894: CPT

## 2023-06-06 PROCEDURE — C1887: CPT

## 2023-06-06 PROCEDURE — 85576 BLOOD PLATELET AGGREGATION: CPT

## 2023-06-06 PROCEDURE — 36223 PLACE CATH CAROTID/INOM ART: CPT | Mod: RT

## 2023-06-06 PROCEDURE — 36223 PLACE CATH CAROTID/INOM ART: CPT

## 2023-06-06 RX ORDER — ASPIRIN/CALCIUM CARB/MAGNESIUM 324 MG
325 TABLET ORAL DAILY
Refills: 0 | Status: DISCONTINUED | OUTPATIENT
Start: 2023-06-06 | End: 2023-06-06

## 2023-06-06 RX ORDER — FAMOTIDINE 10 MG/ML
20 INJECTION INTRAVENOUS DAILY
Refills: 0 | Status: DISCONTINUED | OUTPATIENT
Start: 2023-06-06 | End: 2023-06-06

## 2023-06-06 NOTE — ASU DISCHARGE PLAN (ADULT/PEDIATRIC) - NPI NUMBER (FOR SYSADMIN USE ONLY) :
Pt home bp readings:    2/16 9:30 140/79 90  2/17 3:15 136/73 79  2/18 9a 145/72 85  2/19 3p 121/69 82   2/20 9:30 141/79 85  2/21 3:15 125/71 74  2/22 10:30 133/76 85  2/23 3 133/73 74  2/24 11:30 144/82 83  2/25 4:15 136/73 84  2/26 10:20 127/69 78  2/27 2:45 137/74 82    Pt took triam/hctz qam and spironolactone qnoon.    3/1 10:45 123/72 79 (took all meds in AM)   [[id number]]

## 2023-06-06 NOTE — H&P ADULT - HISTORY OF PRESENT ILLNESS
This is 76yo female with history of acomm artery aneurysm s/p coil and stenting, presents today to neuro IR for cerebral angiogram,

## 2023-06-06 NOTE — CHART NOTE - NSCHARTNOTEFT_GEN_A_CORE
Interventional Neuro- Radiology   Procedure Note      Procedure: Selective Cerebral Angiography   Pre- Procedure Diagnosis: ACOMM aneurysm s/p coil/ stent   Post- Procedure Diagnosis:    : Dr. Otis MD  Fellow: MD Dr. Justo Walker MD Dr. Toscano, MD   NP: Faye     RN:  Tech:    Anesthesia: Dr. Cramer (MAC)     I/Os:  Fluids:  Foster: DTV   Contrast:  Estimated Blood Loss: <10cc    Preliminary Report:  Under MAC, using a ___Fr short/long sheath to the right femoral artery examination of left vertebral artery/ left internal carotid artery/ left external carotid artery/ right vertebral artery/ right internal carotid artery/ right external carotid artery via selective cerebral angiography demonstrates ________. ( Official note to follow).    Patient tolerated procedure well, vital signs stable, hemodynamically stable, no change in neurological status compared to baseline. Results discussed with neurosurgery/ patient and their family. Groin sheath d/c'ed, manual compression held to hemostasis, no active bleeding, no hematoma, Vascade applied, quick clot and safeguard balloon dressing applied at _____h. Patient transferred to IR recovery for further care/ monitoring. Interventional Neuro- Radiology   Procedure Note      Procedure: Selective Cerebral Angiography   Pre- Procedure Diagnosis: ACOMM aneurysm s/p coil/ stent   Post- Procedure Diagnosis: no residual filling of aneurysm     : Dr. Otis MD  Fellow: Dr. Albin MD    NP: Faye     RN: Crystal   Tech: Thomas/ Saul     Anesthesia: Dr. Cramer (MAC)     I/Os:  Fluids: 250cc   Foster: DTV   Contrast: 77cc   Estimated Blood Loss: <10cc    Preliminary Report:  Under MAC, using a 6Fr sheath to the left femoral artery examination of right internal carotid artery via selective cerebral angiography demonstrates no residual filling of acomm aneurysm no in stant stenosis. ( Official note to follow).    Patient tolerated procedure well, vital signs stable, hemodynamically stable, no change in neurological status compared to baseline. Results discussed with neurosurgery/ patient and their family. Groin sheath d/c'ed, manual compression held to hemostasis, no active bleeding, no hematoma, Vascade applied, quick clot and safeguard balloon dressing applied at 1000h. Patient transferred to IR recovery for further care/ monitoring.    Lakeisha Elena PA-C  [phone]

## 2023-06-06 NOTE — ASU DISCHARGE PLAN (ADULT/PEDIATRIC) - CARE PROVIDER_API CALL
Meliton Berg  Neurosurgery  [address], [address]  [place] [address]  Phone: [phone]  Fax: [phone]  Follow Up Time:

## 2023-06-06 NOTE — H&P ADULT - ASSESSMENT
This is 78yo female with history of acomm artery aneurysm s/p coil and stenting, presents today to neuro IR for cerebral angiogram,

## 2023-06-06 NOTE — ASU PATIENT PROFILE, ADULT - FALL HARM RISK - UNIVERSAL INTERVENTIONS
Bed in lowest position, wheels locked, appropriate side rails in place/Call bell, personal items and telephone in reach/Instruct patient to call for assistance before getting out of bed or chair/Non-slip footwear when patient is out of bed/[place] to call system/Physically safe environment - no spills, clutter or unnecessary equipment/Purposeful Proactive Rounding/Room/bathroom lighting operational, light cord in reach

## 2023-10-23 NOTE — H&P PST ADULT - COMFORT LEVEL, ACCEPTABLE
Bedside report received from Comfort MUHAMMAD and assumed Pt's cares. Call light within reach. Safety measures in place.    4

## 2023-11-27 ENCOUNTER — APPOINTMENT (OUTPATIENT)
Dept: CT IMAGING | Facility: CLINIC | Age: 78
End: 2023-11-27
Payer: MEDICARE

## 2023-11-27 PROCEDURE — 75571 CT HRT W/O DYE W/CA TEST: CPT | Mod: MH

## 2024-01-04 ENCOUNTER — INPATIENT (INPATIENT)
Facility: HOSPITAL | Age: 79
LOS: 1 days | Discharge: HOME | DRG: 322 | End: 2024-01-06
Attending: INTERNAL MEDICINE | Admitting: INTERNAL MEDICINE
Payer: MEDICARE

## 2024-01-04 ENCOUNTER — TRANSCRIPTION ENCOUNTER (OUTPATIENT)
Age: 79
End: 2024-01-04

## 2024-01-04 VITALS
OXYGEN SATURATION: 96 % | RESPIRATION RATE: 17 BRPM | TEMPERATURE: 98 F | WEIGHT: 186.95 LBS | HEART RATE: 70 BPM | HEIGHT: 61 IN | DIASTOLIC BLOOD PRESSURE: 62 MMHG | SYSTOLIC BLOOD PRESSURE: 141 MMHG

## 2024-01-04 DIAGNOSIS — Z90.49 ACQUIRED ABSENCE OF OTHER SPECIFIED PARTS OF DIGESTIVE TRACT: Chronic | ICD-10-CM

## 2024-01-04 DIAGNOSIS — I25.10 ATHEROSCLEROTIC HEART DISEASE OF NATIVE CORONARY ARTERY WITHOUT ANGINA PECTORIS: ICD-10-CM

## 2024-01-04 DIAGNOSIS — C67.9 MALIGNANT NEOPLASM OF BLADDER, UNSPECIFIED: Chronic | ICD-10-CM

## 2024-01-04 DIAGNOSIS — Z98.890 OTHER SPECIFIED POSTPROCEDURAL STATES: Chronic | ICD-10-CM

## 2024-01-04 LAB
ANION GAP SERPL CALC-SCNC: 16 MMOL/L — SIGNIFICANT CHANGE UP (ref 5–17)
ANION GAP SERPL CALC-SCNC: 16 MMOL/L — SIGNIFICANT CHANGE UP (ref 5–17)
BUN SERPL-MCNC: 30 MG/DL — HIGH (ref 7–23)
BUN SERPL-MCNC: 30 MG/DL — HIGH (ref 7–23)
CALCIUM SERPL-MCNC: 10.3 MG/DL — SIGNIFICANT CHANGE UP (ref 8.4–10.5)
CALCIUM SERPL-MCNC: 10.3 MG/DL — SIGNIFICANT CHANGE UP (ref 8.4–10.5)
CHLORIDE SERPL-SCNC: 102 MMOL/L — SIGNIFICANT CHANGE UP (ref 96–108)
CHLORIDE SERPL-SCNC: 102 MMOL/L — SIGNIFICANT CHANGE UP (ref 96–108)
CO2 SERPL-SCNC: 22 MMOL/L — SIGNIFICANT CHANGE UP (ref 22–31)
CO2 SERPL-SCNC: 22 MMOL/L — SIGNIFICANT CHANGE UP (ref 22–31)
CREAT SERPL-MCNC: 1.39 MG/DL — HIGH (ref 0.5–1.3)
CREAT SERPL-MCNC: 1.39 MG/DL — HIGH (ref 0.5–1.3)
EGFR: 39 ML/MIN/1.73M2 — LOW
EGFR: 39 ML/MIN/1.73M2 — LOW
GLUCOSE SERPL-MCNC: 110 MG/DL — HIGH (ref 70–99)
GLUCOSE SERPL-MCNC: 110 MG/DL — HIGH (ref 70–99)
HCT VFR BLD CALC: 32.2 % — LOW (ref 34.5–45)
HCT VFR BLD CALC: 32.2 % — LOW (ref 34.5–45)
HGB BLD-MCNC: 10.1 G/DL — LOW (ref 11.5–15.5)
HGB BLD-MCNC: 10.1 G/DL — LOW (ref 11.5–15.5)
MCHC RBC-ENTMCNC: 27.4 PG — SIGNIFICANT CHANGE UP (ref 27–34)
MCHC RBC-ENTMCNC: 27.4 PG — SIGNIFICANT CHANGE UP (ref 27–34)
MCHC RBC-ENTMCNC: 31.4 GM/DL — LOW (ref 32–36)
MCHC RBC-ENTMCNC: 31.4 GM/DL — LOW (ref 32–36)
MCV RBC AUTO: 87.3 FL — SIGNIFICANT CHANGE UP (ref 80–100)
MCV RBC AUTO: 87.3 FL — SIGNIFICANT CHANGE UP (ref 80–100)
NRBC # BLD: 0 /100 WBCS — SIGNIFICANT CHANGE UP (ref 0–0)
NRBC # BLD: 0 /100 WBCS — SIGNIFICANT CHANGE UP (ref 0–0)
PLATELET # BLD AUTO: 243 K/UL — SIGNIFICANT CHANGE UP (ref 150–400)
PLATELET # BLD AUTO: 243 K/UL — SIGNIFICANT CHANGE UP (ref 150–400)
POTASSIUM SERPL-MCNC: 4.5 MMOL/L — SIGNIFICANT CHANGE UP (ref 3.5–5.3)
POTASSIUM SERPL-MCNC: 4.5 MMOL/L — SIGNIFICANT CHANGE UP (ref 3.5–5.3)
POTASSIUM SERPL-SCNC: 4.5 MMOL/L — SIGNIFICANT CHANGE UP (ref 3.5–5.3)
POTASSIUM SERPL-SCNC: 4.5 MMOL/L — SIGNIFICANT CHANGE UP (ref 3.5–5.3)
RBC # BLD: 3.69 M/UL — LOW (ref 3.8–5.2)
RBC # BLD: 3.69 M/UL — LOW (ref 3.8–5.2)
RBC # FLD: 14.5 % — SIGNIFICANT CHANGE UP (ref 10.3–14.5)
RBC # FLD: 14.5 % — SIGNIFICANT CHANGE UP (ref 10.3–14.5)
SODIUM SERPL-SCNC: 140 MMOL/L — SIGNIFICANT CHANGE UP (ref 135–145)
SODIUM SERPL-SCNC: 140 MMOL/L — SIGNIFICANT CHANGE UP (ref 135–145)
WBC # BLD: 7.19 K/UL — SIGNIFICANT CHANGE UP (ref 3.8–10.5)
WBC # BLD: 7.19 K/UL — SIGNIFICANT CHANGE UP (ref 3.8–10.5)
WBC # FLD AUTO: 7.19 K/UL — SIGNIFICANT CHANGE UP (ref 3.8–10.5)
WBC # FLD AUTO: 7.19 K/UL — SIGNIFICANT CHANGE UP (ref 3.8–10.5)

## 2024-01-04 PROCEDURE — 92933 PRQ TRLML C ATHRC ST ANGIOP1: CPT | Mod: LD

## 2024-01-04 PROCEDURE — 99152 MOD SED SAME PHYS/QHP 5/>YRS: CPT

## 2024-01-04 PROCEDURE — 92978 ENDOLUMINL IVUS OCT C 1ST: CPT | Mod: 26,59

## 2024-01-04 PROCEDURE — 93454 CORONARY ARTERY ANGIO S&I: CPT | Mod: 26,59

## 2024-01-04 PROCEDURE — 93010 ELECTROCARDIOGRAM REPORT: CPT

## 2024-01-04 RX ORDER — SODIUM CHLORIDE 9 MG/ML
1000 INJECTION INTRAMUSCULAR; INTRAVENOUS; SUBCUTANEOUS
Refills: 0 | Status: DISCONTINUED | OUTPATIENT
Start: 2024-01-04 | End: 2024-01-06

## 2024-01-04 RX ORDER — LABETALOL HCL 100 MG
100 TABLET ORAL EVERY 12 HOURS
Refills: 0 | Status: DISCONTINUED | OUTPATIENT
Start: 2024-01-04 | End: 2024-01-06

## 2024-01-04 RX ORDER — ASPIRIN/CALCIUM CARB/MAGNESIUM 324 MG
81 TABLET ORAL DAILY
Refills: 0 | Status: DISCONTINUED | OUTPATIENT
Start: 2024-01-04 | End: 2024-01-06

## 2024-01-04 RX ORDER — FUROSEMIDE 40 MG
20 TABLET ORAL DAILY
Refills: 0 | Status: DISCONTINUED | OUTPATIENT
Start: 2024-01-04 | End: 2024-01-06

## 2024-01-04 RX ORDER — LABETALOL HCL 100 MG
1 TABLET ORAL
Refills: 0 | DISCHARGE

## 2024-01-04 RX ORDER — TICAGRELOR 90 MG/1
90 TABLET ORAL EVERY 12 HOURS
Refills: 0 | Status: DISCONTINUED | OUTPATIENT
Start: 2024-01-04 | End: 2024-01-06

## 2024-01-04 RX ORDER — FUROSEMIDE 40 MG
1 TABLET ORAL
Refills: 0 | DISCHARGE

## 2024-01-04 RX ORDER — ONDANSETRON 8 MG/1
4 TABLET, FILM COATED ORAL ONCE
Refills: 0 | Status: COMPLETED | OUTPATIENT
Start: 2024-01-04 | End: 2024-01-05

## 2024-01-04 RX ORDER — ONDANSETRON 8 MG/1
4 TABLET, FILM COATED ORAL ONCE
Refills: 0 | Status: COMPLETED | OUTPATIENT
Start: 2024-01-04 | End: 2024-01-04

## 2024-01-04 RX ADMIN — SODIUM CHLORIDE 75 MILLILITER(S): 9 INJECTION INTRAMUSCULAR; INTRAVENOUS; SUBCUTANEOUS at 14:18

## 2024-01-04 RX ADMIN — ONDANSETRON 4 MILLIGRAM(S): 8 TABLET, FILM COATED ORAL at 14:18

## 2024-01-04 RX ADMIN — SODIUM CHLORIDE 75 MILLILITER(S): 9 INJECTION INTRAMUSCULAR; INTRAVENOUS; SUBCUTANEOUS at 21:28

## 2024-01-04 NOTE — DISCHARGE NOTE PROVIDER - NSDCMRMEDTOKEN_GEN_ALL_CORE_FT
aspirin 325 mg oral tablet: 1 tab(s) orally once a day   enalapril 20 mg oral tablet: 1 tab(s) orally once a day  ezetimibe 10 mg oral tablet: 1 tab(s) orally once a day  furosemide 20 mg oral tablet: 1 tab(s) orally once a day  Trandate 100 mg oral tablet: 1 tab(s) orally every 12 hours   acetaminophen 325 mg oral tablet: 2 tab(s) orally every 4 hours as needed for  mild pain  aspirin 81 mg oral delayed release tablet: 1 tab(s) orally once a day  Brilinta (ticagrelor) 90 mg oral tablet: 1 tab(s) orally every 12 hours  enalapril 20 mg oral tablet: 1 tab(s) orally once a day  ezetimibe 10 mg oral tablet: 1 tab(s) orally once a day  furosemide 20 mg oral tablet: 1 tab(s) orally once a day  Trandate 100 mg oral tablet: 1 tab(s) orally every 12 hours

## 2024-01-04 NOTE — H&P CARDIOLOGY - NSICDXPASTMEDICALHX_GEN_ALL_CORE_FT
PAST MEDICAL HISTORY:  Bladder cancer     COVID-19 virus infection -dec 2021 (mild symptoms)    History of cerebral aneurysm     History of wrist fracture     Hypertension     Stenosis of brachiocephalic artery     Subclavian artery aneurysm     Torn meniscus -left knee, getting PT

## 2024-01-04 NOTE — DISCHARGE NOTE PROVIDER - CARE PROVIDER_API CALL
Cuong Espinal  Cardiovascular Disease  [address] [address]  Phone: [phone]  Fax: [phone]  Follow Up Time: 2 weeks   Cuong Espinal  Cardiovascular Disease  [address] [address]  Phone: [phone]  Fax: [phone]  Follow Up Time: 2 weeks

## 2024-01-04 NOTE — H&P CARDIOLOGY - HISTORY OF PRESENT ILLNESS
79 years ago w pmh of Bladder CA w surgery and chemo, R wrist fracture w screws/plate, cerebral artery embolization w endovascular coiling of a brain aneurysm w stents ( was on brilinta until 6/2023), subclavian artery stent, R brachiocephalic stent who presents for a [facility] with Dr. Langford. Pt states she had a CT angiogram for a work up of her mod-severe AS. In the CT angio they found agatston score of 1628 corresponding to the 98 percentile. Pt referred by Dr. Espinal for a C. She states she is having no symptoms of chest pain, SOB, numbness/tingling , dizziness.  79 years ago w pmh of Bladder CA w surgery and chemo, R wrist fracture w screws/plate, cerebral artery embolization w endovascular coiling of a brain aneurysm w stents ( was on brilinta until 6/2023), subclavian artery stent, R brachiocephalic stent who presents for a [facility] with Dr. Langford. Pt states she had a CT angiogram for a work up of her mod-severe AS. In the CT angio they found agatston score of 1628 corresponding to the 98 percentile. Pt referred by Dr. Espinal for a C. She states she is having no symptoms of chest pain, SOB, numbness/tingling , dizziness.

## 2024-01-04 NOTE — PATIENT PROFILE ADULT - NSPROPTRIGHTREPPHONE_GEN_A_NUR
Follow up with pcp in one week.    Stay well hydrated.    Meloxicam is like a strong advil, to be taken for short term.  Please do not use more than 2 weeks in a row.    Diabetic diet   [phone]

## 2024-01-04 NOTE — PATIENT PROFILE ADULT - FALL HARM RISK - UNIVERSAL INTERVENTIONS
Bed in lowest position, wheels locked, appropriate side rails in place/Call bell, personal items and telephone in reach/Instruct patient to call for assistance before getting out of bed or chair/Non-slip footwear when patient is out of bed/[place] to call system/Physically safe environment - no spills, clutter or unnecessary equipment/Purposeful Proactive Rounding/Room/bathroom lighting operational, light cord in reach Bed in lowest position, wheels locked, appropriate side rails in place/Call bell, personal items and telephone in reach/Instruct patient to call for assistance before getting out of bed or chair/Non-slip footwear when patient is out of bed/[place] to call system/Physically safe environment - no spills, clutter or unnecessary equipment/Purposeful Proactive Rounding/Room/bathroom lighting operational, light cord in reach

## 2024-01-04 NOTE — DISCHARGE NOTE PROVIDER - NSDCCPTREATMENT_GEN_ALL_CORE_FT
PRINCIPAL PROCEDURE  Procedure: Left heart catheterization  Findings and Treatment: Procedures Performed   Procedures:               1.    Arterial Access - Left Femoral   2.    Diagnostic Coronary Angiography   3.    Ultrasound Guided Access   4.    PCI: Orbital Atherectomy   5.    IVUS   6.    PCI: MARYJO   Indications:               Abnormal CTA   PCI Status:               elective   Conclusions:   1. Severe atherosclerosis of the pLAD s/p PCI with CSI and MARYJO x 2   Recommendations:   Continue DAPT and risk factor modification.     PRINCIPAL PROCEDURE  Procedure: Left heart catheterization  Findings and Treatment: Two stents placed to the prox LAD.  Continue DAPT and risk factor modification.

## 2024-01-04 NOTE — CHART NOTE - NSCHARTNOTEFT_GEN_A_CORE
Removal of Left Femoral Sheath by Walker Barry (Cards fellow)    Pulses in the right and left lower extremity are palpable/audible by doppler/absent.   The patient was placed in the supine position. The insertion site was identified and the sutures were removed per protocol.    The __7__ [place] femoral sheath was then removed.   Direct pressure was applied for  __25____ minutes.   Complications: None, VSS, Good Hemostasis.     Monitoring of the right/left groin and both lower extremities including neuro-vascular checks and vital signs every 15 minutes x 4, then every 30 minutes x 2, then every 1 hour was ordered.    Discharge Instruction discussed with patient: ASA, Brilinta, statin, diet, activities, access site care, follow up care, reportable signs and symptoms.     A/P   79 years ago w pmh of Bladder CA w surgery and chemo, R wrist fracture w screws/plate, cerebral artery embolization w endovascular coiling of a brain aneurysm w stents ( was on brilinta until 6/2023), subclavian artery stent, R brachiocephalic stent who presents for a [facility] with Dr. Langford. Pt states she had a CT angiogram for a work up of her mod-severe AS. In the CT angio they found agatston score of 1628 corresponding to the 98 percentile. Pt referred by Dr. Espnial for a C. She states she is having no symptoms of chest pain, SOB, numbness/tingling , dizziness.        Plan: continue to monitor, Continue ASA, Brillinta, Statin,   discharge home in am if stable.  Pt will follow up with his/her cardiologist in 1-2weeks. Removal of Left Femoral Sheath by Walker Barry (Cards fellow)    Pulses in the right and left lower extremity are palpable/audible by doppler/absent.   The patient was placed in the supine position. The insertion site was identified and the sutures were removed per protocol.    The __7__ [place] femoral sheath was then removed.   Direct pressure was applied for  __25____ minutes.   Complications: None, VSS, Good Hemostasis.     Monitoring of the right/left groin and both lower extremities including neuro-vascular checks and vital signs every 15 minutes x 4, then every 30 minutes x 2, then every 1 hour was ordered.    Discharge Instruction discussed with patient: ASA, Brilinta, statin, diet, activities, access site care, follow up care, reportable signs and symptoms.     A/P   79 years ago w pmh of Bladder CA w surgery and chemo, R wrist fracture w screws/plate, cerebral artery embolization w endovascular coiling of a brain aneurysm w stents ( was on brilinta until 6/2023), subclavian artery stent, R brachiocephalic stent who presents for a [facility] with Dr. Langford. Pt states she had a CT angiogram for a work up of her mod-severe AS. In the CT angio they found agatston score of 1628 corresponding to the 98 percentile. Pt referred by Dr. Espinal for a C. She states she is having no symptoms of chest pain, SOB, numbness/tingling , dizziness.        Plan: continue to monitor, Continue ASA, Brillinta, Statin,   discharge home in am if stable.  Pt will follow up with his/her cardiologist in 1-2weeks.

## 2024-01-04 NOTE — ASU PATIENT PROFILE, ADULT - FALL HARM RISK - UNIVERSAL INTERVENTIONS
Bed in lowest position, wheels locked, appropriate side rails in place/Call bell, personal items and telephone in reach/Instruct patient to call for assistance before getting out of bed or chair/Non-slip footwear when patient is out of bed/[place] to call system/Physically safe environment - no spills, clutter or unnecessary equipment/Purposeful Proactive Rounding/Room/bathroom lighting operational, light cord in reach Bed in lowest position, wheels locked, appropriate side rails in place/Call bell, personal items and telephone in reach/Instruct patient to call for assistance before getting out of bed or chair/Non-slip footwear when patient is out of bed/[place] to call system/Physically safe environment - no spills, clutter or unnecessary equipment/Purposeful Proactive Rounding/Room/bathroom lighting operational, light cord in reach

## 2024-01-04 NOTE — DISCHARGE NOTE PROVIDER - HOSPITAL COURSE
HPI:  79 years ago w pmh of Bladder CA w surgery and chemo, R wrist fracture w screws/plate, cerebral artery embolization w endovascular coiling of a brain aneurysm w stents ( was on brilinta until 6/2023), subclavian artery stent, R brachiocephalic stent who presents for a [facility] with Dr. Langford. Pt states she had a CT angiogram for a work up of her mod-severe AS. In the CT angio they found agatston score of 1628 corresponding to the 98 percentile. Pt referred by Dr. Espinal for a LHC. She states she is having no symptoms of chest pain, SOB, numbness/tingling , dizziness.  (04 Jan 2024 08:41)    Patient successfully underwent [facility] - CSI/PCI of prox LAD x 2 MARYJO via LFA with Dr. Langford on 1/4/24  access site stable without bleeding, edema, or hematoma  Continue Aspirin, Brilinta  Pending price check for Brilinta in AM  Patient has intolerance of Statin. To be followed as outpatient with her Cards, Dr. Espinal      HPI:  79 years ago w pmh of Bladder CA w surgery and chemo, R wrist fracture w screws/plate, cerebral artery embolization w endovascular coiling of a brain aneurysm w stents ( was on brilinta until 6/2023), subclavian artery stent, R brachiocephalic stent who presents for a [facility] with Dr. Langford. Pt states she had a CT angiogram for a work up of her mod-severe AS. In the CT angio they found agatston score of 1628 corresponding to the 98 percentile. Pt referred by Dr. Espinal for a LHC. She states she is having no symptoms of chest pain, SOB, numbness/tingling , dizziness.  (04 Jan 2024 08:41)    Patient successfully underwent [facility] - CSI/PCI of prox LAD x 2 MARYJO via LFA with Dr. Langford on 1/4/24  access site stable without bleeding, edema, or hematoma  Continue Aspirin, Brilinta  Pending price check for Brilinta in AM  Patient has intolerance of Statin. To be followed as outpatient with her Cards, Dr. Espinal      HPI:  79 years ago w pmh of Bladder CA w surgery and chemo, R wrist fracture w screws/plate, cerebral artery embolization w endovascular coiling of a brain aneurysm w stents ( was on brilinta until 6/2023), subclavian artery stent, R brachiocephalic stent who presents for a [facility] with Dr. Langford. Pt states she had a CT angiogram for a work up of her mod-severe AS. In the CT angio they found agatston score of 1628 corresponding to the 98 percentile. Pt referred by Dr. Espinal for a C. She states she is having no symptoms of chest pain, SOB, numbness/tingling , dizziness.  (04 Jan 2024 08:41)    1/5/24 CSI/PCI of prox LAD x 2 MARYJO via left femoral access, site stable without bleeding, edema, or hematoma.  Continue Aspirin, Brilinta  Pending price check for Brilinta in AM  Patient has intolerance of Statin. To be followed as outpatient with her Cards, Dr. Espinal      HPI:  79 years ago w pmh of Bladder CA w surgery and chemo, R wrist fracture w screws/plate, cerebral artery embolization w endovascular coiling of a brain aneurysm w stents ( was on brilinta until 6/2023), subclavian artery stent, R brachiocephalic stent who presents for a [facility] with Dr. Langford. Pt states she had a CT angiogram for a work up of her mod-severe AS. In the CT angio they found agatston score of 1628 corresponding to the 98 percentile. Pt referred by Dr. Espinal for a C. She states she is having no symptoms of chest pain, SOB, numbness/tingling , dizziness.  (04 Jan 2024 08:41)    1/5/24 CSI/PCI of prox LAD x 2 MARYJO via left femoral access, site stable without bleeding, edema, or hematoma.  Continue Aspirin, Brilinta  Pending price check for Brilinta in AM  Patient has intolerance of Statin. To be followed as outpatient with her Cards, Dr. Espinal      HPI:  79 years ago w pmh of Bladder CA w surgery and chemo, R wrist fracture w screws/plate, cerebral artery embolization w endovascular coiling of a brain aneurysm w stents ( was on brilinta until 6/2023), subclavian artery stent, R brachiocephalic stent who presents for a [facility] with Dr. Langford. Pt states she had a CT angiogram for a work up of her mod-severe AS. In the CT angio they found agatston score of 1628 corresponding to the 98 percentile. Pt referred by Dr. Espinal for a [facility]. She states she is having no symptoms of chest pain, SOB, numbness/tingling , dizziness.  (04 Jan 2024 08:41)    1/5/24 CSI/PCI of prox LAD x 2 MARYJO via left femoral access, site stable without bleeding, edema, or hematoma.  Continue Aspirin, Brilinta  Pending price check for Brilinta in AM  Patient has intolerance of Statin. To be followed as outpatient with her Cards, Dr. Espinal     1/6/2024: Patient appears comfortable this AM, she denies any reportable symptoms. RFA site is stable, soft nontender. Patient ambulated today tolerated well. Vascular team at the bedside to assess patient and review CT scan, patient stable, no further follow up (or outpatient follow up) at this time. Patient is HDS. Exam w/o evidence of pseudoaneurysm, significant groin swelling, or distal malperfusion of LLE. H/H is stable    Arterial Duplex showing no pseudoaneurysm. Exam stable on AM rounds. H/H stable. Hemodynamically stable. Case reviewed with Dr Gallagher-  pt is stable for discharge today     HPI:  79 years ago w pmh of Bladder CA w surgery and chemo, R wrist fracture w screws/plate, cerebral artery embolization w endovascular coiling of a brain aneurysm w stents ( was on brilinta until 6/2023), subclavian artery stent, R brachiocephalic stent who presents for a [facility] with Dr. Langford. Pt states she had a CT angiogram for a work up of her mod-severe AS. In the CT angio they found agatston score of 1628 corresponding to the 98 percentile. Pt referred by Dr. Espinal for a [facility]. She states she is having no symptoms of chest pain, SOB, numbness/tingling , dizziness.  (04 Jan 2024 08:41)    1/5/24 CSI/PCI of prox LAD x 2 MARYJO via left femoral access, site stable without bleeding, edema, or hematoma.  Continue Aspirin, Brilinta  Pending price check for Brilinta in AM  Patient has intolerance of Statin. To be followed as outpatient with her Cards, Dr. Espinal     1/6/2024: Patient appears comfortable this AM, she denies any reportable symptoms. RFA site is stable, soft nontender. Patient ambulated today tolerated well. Vascular team at the bedside to assess patient and review CT scan, patient stable, no further follow up (or outpatient follow up) at this time. Patient is HDS. Exam w/o evidence of pseudoaneurysm, significant groin swelling, or distal malperfusion of LLE. H/H is stable    Arterial Duplex showing no pseudoaneurysm. Exam stable on AM rounds. H/H stable. Hemodynamically stable. Case reviewed with Dr Gallagher-  pt is stable for discharge today

## 2024-01-04 NOTE — H&P CARDIOLOGY - EKG AND INTERPRETATION
NSR 66 Muscle Hinge Flap Text: The defect edges were debeveled with a #15 scalpel blade.  Given the size, depth and location of the defect and the proximity to free margins a muscle hinge flap was deemed most appropriate.  Using a sterile surgical marker, an appropriate hinge flap was drawn incorporating the defect. The area thus outlined was incised with a #15 scalpel blade.  The skin margins were undermined to an appropriate distance in all directions utilizing iris scissors.

## 2024-01-04 NOTE — PATIENT PROFILE ADULT - ARE SIGNIFICANT INDICATORS COMPLETE.
Subjective   Patient ID: Madelin is a 59 year old female.    Chief Complaint   Patient presents with   • Follow-up     seen in [facility] for right knee pain     60 yo female presents for fup from [facility]>  Was seen recently for R knee pain.  Had xray done showing mild oa.   rx prednisone for 5 days.   Helped but the swelling returned after she finished the 5 days.     Feels it is a little better now.    Was doing bike riding and walking.   Now feels the R knee is tight.  Cannot pedal the bike right now       Henri has Benign hypertension; Bulging lumbar disc; Cataract; Glaucoma; Hypertensive retinopathy of both eyes; Lumbar radiculopathy; Need for immunization against influenza; Neutropenia (CMS/HCC); Peripheral neuropathy; Prediabetes; Visit for screening mammogram; and Osteoarthritis of right knee on their problem list.  Henri has a current medication list which includes the following prescription(s): amlodipine and latanoprost.  Henri is allergic to dust; mold   (environmental); and pollen.    Review of Systems   Constitutional: Negative.    HENT: Negative.    Respiratory: Negative.    Cardiovascular: Negative.    Gastrointestinal: Negative.    Skin: Negative.      Current Outpatient Medications   Medication Sig Dispense Refill   • amLODIPine (NORVASC) 5 MG tablet TAKE 1 TABLET BY MOUTH DAILY 90 tablet 0   • latanoprost (XALATAN) 0.005 % ophthalmic solution 1 drop nightly.       No current facility-administered medications for this visit.       Objective    Visit Vitals  /78   Pulse 91   Temp 98 °F (36.7 °C)   Resp 16   Ht 5' 4\" (1.626 m)   Wt 67.7 kg (149 lb 4 oz)   SpO2 97%   BMI 25.62 kg/m²       Physical Exam  Vitals reviewed.   Constitutional:       General: She is not in acute distress.  Musculoskeletal:         General: Swelling (1+ effusion R knee) and tenderness present. Normal range of motion.      Left lower leg: Left lower leg edema: over R knee, mcmurrays neg, mcl/lcl intact.   Neurological:       Mental Status: She is alert.         Assessment   Problem List Items Addressed This Visit        Musculoskeletal    Osteoarthritis of right knee - Primary      Restart glucosamine and chondroitin supplement  Do strengthening of the legs.  Do not over due any exercise.  Take occasional nsaid.  If not getting better then refer to ortho.    Yes

## 2024-01-04 NOTE — DISCHARGE NOTE PROVIDER - NSDCCPCAREPLAN_GEN_ALL_CORE_FT
PRINCIPAL DISCHARGE DIAGNOSIS  Diagnosis: CAD (coronary artery disease)  Assessment and Plan of Treatment: Coronary artery disease is a condition where the arteries the supply the heart muscle get clogged with fatty deposits & puts you at risk for a heart attack. You underwent left heart catheterization and 2 stents were placed to LAD via left fermoal artery   Call your doctor if you have any new pain, pressure, or discomfort in the center of your chest, pain, tingling or discomfort in arms, back, neck, jaw, or stomach, shortness of breath, nausea, vomiting, burping or heartburn, sweating, cold and clammy skin, racing or abnormal heartbeat for more than 10 minutes or if they keep coming & going.  Call 911 and do not tr to get to hospital by care  You can help yourself with lifestyle changes (quitting smoking if you smoke), eat lots of fruits & vegetables & low fat dairy products, not a lot of meat & fatty foods, walk or some form of physical activity most days of the week, lose weight if you are overweight  Take your cardiac medication as prescribed to lower cholesterol, to lower blood pressure, aspirin to prevent blood clots, and diabetes control  Make sure to keep appointments with doctor for cardiac follow up care       PRINCIPAL DISCHARGE DIAGNOSIS  Diagnosis: CAD (coronary artery disease)  Assessment and Plan of Treatment: Coronary artery disease is a condition where the arteries the supply the heart muscle get clogged with fatty deposits & puts you at risk for a heart attack. You underwent left heart catheterization and 2 stents were placed to LAD via left fermoal artery   Call your doctor if you have any new pain, pressure, or discomfort in the center of your chest, pain, tingling or discomfort in arms, back, neck, jaw, or stomach, shortness of breath, nausea, vomiting, burping or heartburn, sweating, cold and clammy skin, racing or abnormal heartbeat for more than 10 minutes or if they keep coming & going.  Call 911 and do not tr to get to hospital by care  You can help yourself with lifestyle changes (quitting smoking if you smoke), eat lots of fruits & vegetables & low fat dairy products, not a lot of meat & fatty foods, walk or some form of physical activity most days of the week, lose weight if you are overweight  Take your cardiac medication as prescribed to lower cholesterol, to lower blood pressure, aspirin to prevent blood clots, and diabetes control  Make sure to keep appointments with doctor for cardiac follow up care        SECONDARY DISCHARGE DIAGNOSES  Diagnosis: HTN (hypertension)  Assessment and Plan of Treatment: Continue with your blood pressure medications; eat a heart healthy diet with low salt diet; exercise regularly (consult with your physician or cardiologist first); maintain a heart healthy weight; if you smoke - quit (A resource to help you stop smoking is the [employer] – phone number [phone].); include healthy ways to manage stress. Continue to follow with your primary care physician or cardiologist.    Diagnosis: HLD (hyperlipidemia)  Assessment and Plan of Treatment: Continue with your cholesterol medications. Eat a heart healthy diet that is low in saturated fats and salt, and includes whole grains, fruits, vegetables and lean protein; exercise regularly (consult with your physician or cardiologist first); maintain a heart healthy weight; if you smoke - quit (A resource to help you stop smoking is the [employer] [facility] [employer] for Tobacco Control – phone number [phone].). Continue to follow with your primary physician or cardiologist.     PRINCIPAL DISCHARGE DIAGNOSIS  Diagnosis: CAD (coronary artery disease)  Assessment and Plan of Treatment: Coronary artery disease is a condition where the arteries the supply the heart muscle get clogged with fatty deposits & puts you at risk for a heart attack. You underwent left heart catheterization and 2 stents were placed to LAD via left fermoal artery   Call your doctor if you have any new pain, pressure, or discomfort in the center of your chest, pain, tingling or discomfort in arms, back, neck, jaw, or stomach, shortness of breath, nausea, vomiting, burping or heartburn, sweating, cold and clammy skin, racing or abnormal heartbeat for more than 10 minutes or if they keep coming & going.  Call 911 and do not tr to get to hospital by care  You can help yourself with lifestyle changes (quitting smoking if you smoke), eat lots of fruits & vegetables & low fat dairy products, not a lot of meat & fatty foods, walk or some form of physical activity most days of the week, lose weight if you are overweight  Take your cardiac medication as prescribed to lower cholesterol, to lower blood pressure, aspirin to prevent blood clots, and diabetes control  Make sure to keep appointments with doctor for cardiac follow up care        SECONDARY DISCHARGE DIAGNOSES  Diagnosis: HTN (hypertension)  Assessment and Plan of Treatment: Continue with your blood pressure medications; eat a heart healthy diet with low salt diet; exercise regularly (consult with your physician or cardiologist first); maintain a heart healthy weight; if you smoke - quit (A resource to help you stop smoking is the [employer] – phone number [phone].); include healthy ways to manage stress. Continue to follow with your primary care physician or cardiologist.    Diagnosis: HLD (hyperlipidemia)  Assessment and Plan of Treatment: Continue with your cholesterol medications. Eat a heart healthy diet that is low in saturated fats and salt, and includes whole grains, fruits, vegetables and lean protein; exercise regularly (consult with your physician or cardiologist first); maintain a heart healthy weight; if you smoke - quit (A resource to help you stop smoking is the [employer] [facility] [employer] for Tobacco Control – phone number [phone].). Continue to follow with your primary physician or cardiologist.

## 2024-01-05 LAB
ANION GAP SERPL CALC-SCNC: 17 MMOL/L — SIGNIFICANT CHANGE UP (ref 5–17)
ANION GAP SERPL CALC-SCNC: 17 MMOL/L — SIGNIFICANT CHANGE UP (ref 5–17)
BLD GP AB SCN SERPL QL: NEGATIVE — SIGNIFICANT CHANGE UP
BLD GP AB SCN SERPL QL: NEGATIVE — SIGNIFICANT CHANGE UP
BUN SERPL-MCNC: 30 MG/DL — HIGH (ref 7–23)
BUN SERPL-MCNC: 30 MG/DL — HIGH (ref 7–23)
CALCIUM SERPL-MCNC: 9.5 MG/DL — SIGNIFICANT CHANGE UP (ref 8.4–10.5)
CALCIUM SERPL-MCNC: 9.5 MG/DL — SIGNIFICANT CHANGE UP (ref 8.4–10.5)
CHLORIDE SERPL-SCNC: 100 MMOL/L — SIGNIFICANT CHANGE UP (ref 96–108)
CHLORIDE SERPL-SCNC: 100 MMOL/L — SIGNIFICANT CHANGE UP (ref 96–108)
CO2 SERPL-SCNC: 21 MMOL/L — LOW (ref 22–31)
CO2 SERPL-SCNC: 21 MMOL/L — LOW (ref 22–31)
CREAT SERPL-MCNC: 1.61 MG/DL — HIGH (ref 0.5–1.3)
CREAT SERPL-MCNC: 1.61 MG/DL — HIGH (ref 0.5–1.3)
EGFR: 32 ML/MIN/1.73M2 — LOW
EGFR: 32 ML/MIN/1.73M2 — LOW
GLUCOSE SERPL-MCNC: 142 MG/DL — HIGH (ref 70–99)
GLUCOSE SERPL-MCNC: 142 MG/DL — HIGH (ref 70–99)
HCT VFR BLD CALC: 25.6 % — LOW (ref 34.5–45)
HCT VFR BLD CALC: 25.6 % — LOW (ref 34.5–45)
HGB BLD-MCNC: 8.1 G/DL — LOW (ref 11.5–15.5)
HGB BLD-MCNC: 8.1 G/DL — LOW (ref 11.5–15.5)
MCHC RBC-ENTMCNC: 27.5 PG — SIGNIFICANT CHANGE UP (ref 27–34)
MCHC RBC-ENTMCNC: 27.5 PG — SIGNIFICANT CHANGE UP (ref 27–34)
MCHC RBC-ENTMCNC: 31.6 GM/DL — LOW (ref 32–36)
MCHC RBC-ENTMCNC: 31.6 GM/DL — LOW (ref 32–36)
MCV RBC AUTO: 86.8 FL — SIGNIFICANT CHANGE UP (ref 80–100)
MCV RBC AUTO: 86.8 FL — SIGNIFICANT CHANGE UP (ref 80–100)
NRBC # BLD: 0 /100 WBCS — SIGNIFICANT CHANGE UP (ref 0–0)
NRBC # BLD: 0 /100 WBCS — SIGNIFICANT CHANGE UP (ref 0–0)
PLATELET # BLD AUTO: 238 K/UL — SIGNIFICANT CHANGE UP (ref 150–400)
PLATELET # BLD AUTO: 238 K/UL — SIGNIFICANT CHANGE UP (ref 150–400)
POTASSIUM SERPL-MCNC: 4.4 MMOL/L — SIGNIFICANT CHANGE UP (ref 3.5–5.3)
POTASSIUM SERPL-MCNC: 4.4 MMOL/L — SIGNIFICANT CHANGE UP (ref 3.5–5.3)
POTASSIUM SERPL-SCNC: 4.4 MMOL/L — SIGNIFICANT CHANGE UP (ref 3.5–5.3)
POTASSIUM SERPL-SCNC: 4.4 MMOL/L — SIGNIFICANT CHANGE UP (ref 3.5–5.3)
RBC # BLD: 2.95 M/UL — LOW (ref 3.8–5.2)
RBC # BLD: 2.95 M/UL — LOW (ref 3.8–5.2)
RBC # FLD: 14.5 % — SIGNIFICANT CHANGE UP (ref 10.3–14.5)
RBC # FLD: 14.5 % — SIGNIFICANT CHANGE UP (ref 10.3–14.5)
RH IG SCN BLD-IMP: NEGATIVE — SIGNIFICANT CHANGE UP
RH IG SCN BLD-IMP: NEGATIVE — SIGNIFICANT CHANGE UP
SODIUM SERPL-SCNC: 138 MMOL/L — SIGNIFICANT CHANGE UP (ref 135–145)
SODIUM SERPL-SCNC: 138 MMOL/L — SIGNIFICANT CHANGE UP (ref 135–145)
WBC # BLD: 13.15 K/UL — HIGH (ref 3.8–10.5)
WBC # BLD: 13.15 K/UL — HIGH (ref 3.8–10.5)
WBC # FLD AUTO: 13.15 K/UL — HIGH (ref 3.8–10.5)
WBC # FLD AUTO: 13.15 K/UL — HIGH (ref 3.8–10.5)

## 2024-01-05 PROCEDURE — 99232 SBSQ HOSP IP/OBS MODERATE 35: CPT

## 2024-01-05 PROCEDURE — 99222 1ST HOSP IP/OBS MODERATE 55: CPT

## 2024-01-05 PROCEDURE — 74176 CT ABD & PELVIS W/O CONTRAST: CPT | Mod: 26

## 2024-01-05 PROCEDURE — 93926 LOWER EXTREMITY STUDY: CPT | Mod: 26,LT

## 2024-01-05 RX ORDER — ACETAMINOPHEN 500 MG
650 TABLET ORAL ONCE
Refills: 0 | Status: DISCONTINUED | OUTPATIENT
Start: 2024-01-05 | End: 2024-01-06

## 2024-01-05 RX ORDER — PANTOPRAZOLE SODIUM 20 MG/1
40 TABLET, DELAYED RELEASE ORAL ONCE
Refills: 0 | Status: COMPLETED | OUTPATIENT
Start: 2024-01-05 | End: 2024-01-05

## 2024-01-05 RX ORDER — DIPHENHYDRAMINE HCL 50 MG
25 CAPSULE ORAL ONCE
Refills: 0 | Status: DISCONTINUED | OUTPATIENT
Start: 2024-01-05 | End: 2024-01-06

## 2024-01-05 RX ORDER — SODIUM CHLORIDE 9 MG/ML
1000 INJECTION INTRAMUSCULAR; INTRAVENOUS; SUBCUTANEOUS
Refills: 0 | Status: DISCONTINUED | OUTPATIENT
Start: 2024-01-05 | End: 2024-01-05

## 2024-01-05 RX ORDER — LIDOCAINE 4 G/100G
1 CREAM TOPICAL ONCE
Refills: 0 | Status: COMPLETED | OUTPATIENT
Start: 2024-01-05 | End: 2024-01-05

## 2024-01-05 RX ORDER — ACETAMINOPHEN 500 MG
1000 TABLET ORAL ONCE
Refills: 0 | Status: COMPLETED | OUTPATIENT
Start: 2024-01-05 | End: 2024-01-05

## 2024-01-05 RX ORDER — TICAGRELOR 90 MG/1
1 TABLET ORAL
Qty: 60 | Refills: 0
Start: 2024-01-05 | End: 2024-02-03

## 2024-01-05 RX ADMIN — ONDANSETRON 4 MILLIGRAM(S): 8 TABLET, FILM COATED ORAL at 15:57

## 2024-01-05 RX ADMIN — Medication 81 MILLIGRAM(S): at 06:48

## 2024-01-05 RX ADMIN — Medication 100 MILLIGRAM(S): at 16:03

## 2024-01-05 RX ADMIN — TICAGRELOR 90 MILLIGRAM(S): 90 TABLET ORAL at 06:48

## 2024-01-05 RX ADMIN — LIDOCAINE 1 PATCH: 4 CREAM TOPICAL at 20:35

## 2024-01-05 RX ADMIN — PANTOPRAZOLE SODIUM 40 MILLIGRAM(S): 20 TABLET, DELAYED RELEASE ORAL at 05:50

## 2024-01-05 RX ADMIN — Medication 1000 MILLIGRAM(S): at 22:10

## 2024-01-05 RX ADMIN — Medication 30 MILLILITER(S): at 15:00

## 2024-01-05 RX ADMIN — Medication 400 MILLIGRAM(S): at 21:49

## 2024-01-05 RX ADMIN — TICAGRELOR 90 MILLIGRAM(S): 90 TABLET ORAL at 17:06

## 2024-01-05 RX ADMIN — Medication 20 MILLIGRAM(S): at 15:58

## 2024-01-05 RX ADMIN — Medication 30 MILLILITER(S): at 05:50

## 2024-01-05 RX ADMIN — Medication 20 MILLIGRAM(S): at 06:48

## 2024-01-05 NOTE — CONSULT NOTE ADULT - ASSESSMENT
CAD  s/p stent to LAD  dapt  statin   BB    HTN  stable    anemia  transfusion   obtain CT abd  pelvis rule out RPB    aortic stenosis  mod to severe  stable    discussed with [facility] May LEWIS

## 2024-01-05 NOTE — CONSULT NOTE ADULT - ATTENDING COMMENTS
78 year old woman with concern for small left groin hematoma  pt asymptomatic relative to left groin findings on non-contrast CT scan - no abdominal or back pain  hemodynamically stable  no hematoma appreciated on exam  left groin arterial duplex ultrasound does not demonstrate hematoma or pseudoaneurysm  please recall vascular surgery with any further questions or concerns

## 2024-01-05 NOTE — CHART NOTE - NSCHARTNOTEFT_GEN_A_CORE
Patient currently receiving 1 uPRBC and had no complaints, abd pain or back pain.   /67     81      18     Gen - NAD   Lungs - CTA b/l   Heart - Reg  S1, S2   Abd - soft to touch non tender to palpation     CT scan pelvis : soft tissue edema is seen in the left inguinal/groin   region with a small hematoma. There is small volume of free fluid in the   left pelvis, with Hounsfield density of 40 units, concerning for blood   products in setting of recent procedure.   Cholelithiasis with multiple   gallstones measuring up to 2.4cm. Hyperattenuation within the gallbladder   lumen represents vicarious excretion of IV contrast. Stable bilateral   adrenal gland thickening    A/P 79 yo F  w pmh of Bladder CA w surgery and chemo, R wrist fracture w screws/plate, cerebral artery embolization w endovascular coiling of a brain aneurysm w stents ( was on brilinta until 6/2023), subclavian artery stent, R brachiocephalic stent s/p cardiac cath stents x 2 to Left anterior descending artery   - now in JEFRY   - anemia , + hematoma , CT results as above   - recheck CBC 4 hrs post transfusion ( 12 MN and 4 am )  -surgery c.s called   - CONSIDER REPEAT Ct with IV contrast to R/O bleed if H/H decreases or hemodynamically unstable   - above d/w Dr Chris Langford and Dr Gallagher

## 2024-01-05 NOTE — CONSULT NOTE ADULT - SUBJECTIVE AND OBJECTIVE BOX
CHIEF COMPLAINT:Patient is a 78y old  Female who presents with a chief complaint of     HISTORY OF PRESENT ILLNESS:    78 male with history as below, HTN CAD, aortic stenosis  s/p PCI LAD  denies any chest pain, sob, palpitation, dizziness or syncope.     PAST MEDICAL & SURGICAL HISTORY:  Stenosis of brachiocephalic artery      Hypertension      History of cerebral aneurysm      Bladder cancer      COVID-19 virus infection  -dec 2021 (mild symptoms)      Torn meniscus  -left knee, getting PT      History of wrist fracture      Subclavian artery aneurysm      H/O cerebral aneurysm repair  coiling 12/2021      S/P ORIF (open reduction internal fixation) fracture  -right wrist 2019      Bladder cancer  -S/P TURBT 15 years ago      H/O colonoscopy      S/P coil embolization of cerebral aneurysm  -december 2021, f/u cerebral angiogram 8/2022      S/P appendectomy              MEDICATIONS:  aspirin  chewable 81 milliGRAM(s) Oral daily  enalapril 20 milliGRAM(s) Oral daily  furosemide    Tablet 20 milliGRAM(s) Oral daily  labetalol 100 milliGRAM(s) Oral every 12 hours  ticagrelor 90 milliGRAM(s) Oral every 12 hours        ondansetron Injectable 4 milliGRAM(s) IV Push once        sodium chloride 0.9%. 1000 milliLiter(s) IV Continuous <Continuous>  sodium chloride 0.9%. 1000 milliLiter(s) IV Continuous <Continuous>      FAMILY HISTORY:      Non-contributory    SOCIAL HISTORY:    No tobacco, drugs or etoh    Allergies    No Known Allergies    Intolerances    statins (Other)  	    REVIEW OF SYSTEMS:  as above  The rest of the 14 points ROS reviewed and except above they are unremarkable.        PHYSICAL EXAM:  T(C): 36.5 (01-05-24 @ 08:14), Max: 37.1 (01-05-24 @ 00:10)  HR: 71 (01-05-24 @ 08:14) (54 - 84)  BP: 103/58 (01-05-24 @ 08:14) (91/54 - 156/68)  RR: 17 (01-05-24 @ 08:14) (16 - 18)  SpO2: 96% (01-05-24 @ 08:14) (93% - 100%)  Wt(kg): --  I&O's Summary    05 Jan 2024 07:01  -  05 Jan 2024 09:27  --------------------------------------------------------  IN: 240 mL / OUT: 0 mL / NET: 240 mL        JVP: Normal  Neck: supple  Lung: clear   CV: S1 S2 , Murmur:  Abd: soft  Ext: No edema  neuro: Awake / alert  Psych: flat affect  Skin: normal      LABS/DATA:    TELEMETRY: 	    ECG:  	   	  CARDIAC MARKERS:                                      8.1    13.15 )-----------( 238      ( 05 Jan 2024 08:48 )             25.6     01-04    140  |  102  |  30<H>  ----------------------------<  110<H>  4.5   |  22  |  1.39<H>    Ca    10.3      04 Jan 2024 08:03      proBNP:   Lipid Profile:   HgA1c:   TSH:           
VASCULAR SURGERY CONSULT NOTE  --------------------------------------------------------------------------------------------    HPI: 78F PMHx Bladder Ca s/p surgery and chemotherapy, cerebral artery aneurysm s/p stent/embo, SC artery stent, R BC stent, mod-severe AS, CAD s/p LHC 1/4/2023 w MARYJO x 2 course cb JEFRY and Hgb 10.1-->8.1. CT A/P non con obtained by [facility], demonstrating L groin hematoma and small volume pelvic free fluid consistent w blood product. Vascular Surgery consulted to evaluate.     Patient is HDS in [facility]. On RA. Labs w Hgb 10.1 --> 8.1. Crt 1.39->1.61. CT imaging as above.     Seen and examined. Denies dizziness, Cp, SOB, vision changes, LE numbness/tingling/weakness, groin swelling or pain. Complaining of some nausea post procedure.       PAST MEDICAL & SURGICAL HISTORY:  Stenosis of brachiocephalic artery      Hypertension      History of cerebral aneurysm      Bladder cancer      COVID-19 virus infection  -dec 2021 (mild symptoms)      Torn meniscus  -left knee, getting PT      History of wrist fracture      Subclavian artery aneurysm      H/O cerebral aneurysm repair  coiling 12/2021      S/P ORIF (open reduction internal fixation) fracture  -right wrist 2019      Bladder cancer  -S/P TURBT 15 years ago      H/O colonoscopy      S/P coil embolization of cerebral aneurysm  -december 2021, f/u cerebral angiogram 8/2022      S/P appendectomy        FAMILY HISTORY:    [] Family history not pertinent as reviewed with the patient and family    ALLERGIES: No Known Allergies  statins (Other)    CURRENT MEDICATIONS  MEDICATIONS (STANDING): acetaminophen     Tablet .. 650 milliGRAM(s) Oral once  aspirin  chewable 81 milliGRAM(s) Oral daily  enalapril 20 milliGRAM(s) Oral daily  furosemide    Tablet 20 milliGRAM(s) Oral daily  labetalol 100 milliGRAM(s) Oral every 12 hours  sodium chloride 0.9%. 1000 milliLiter(s) IV Continuous <Continuous>  sodium chloride 0.9%. 1000 milliLiter(s) IV Continuous <Continuous>  ticagrelor 90 milliGRAM(s) Oral every 12 hours    MEDICATIONS (PRN):diphenhydrAMINE 25 milliGRAM(s) Oral once PRN Rash and/or Itching    --------------------------------------------------------------------------------------------    Vitals:   T(C): 36.8 (01-05-24 @ 19:09), Max: 37.1 (01-05-24 @ 00:10)  HR: 71 (01-05-24 @ 19:09) (62 - 87)  BP: 117/56 (01-05-24 @ 19:09) (101/54 - 164/70)  RR: 17 (01-05-24 @ 19:09) (16 - 18)  SpO2: 97% (01-05-24 @ 19:09) (93% - 100%)  CAPILLARY BLOOD GLUCOSE        CAPILLARY BLOOD GLUCOSE          01-05 @ 07:01  -  01-05 @ 20:26  --------------------------------------------------------  IN:    Oral Fluid: 480 mL  Total IN: 480 mL    OUT:  Total OUT: 0 mL    Total NET: 480 mL        Height (cm): 154.9 (01-05 @ 00:10)  Weight (kg): 84.8 (01-05 @ 00:10)  BMI (kg/m2): 35.3 (01-05 @ 00:10)  BSA (m2): 1.84 (01-05 @ 00:10)    PHYSICAL EXAM:   General: Alert, NAD  Neuro: A+Ox3  HEENT: NC/AT, no asymmetry, no scleral icterus  Neck: Soft, supple  Cardio: RRR  Resp: Airway patent, unlabored breathing  Thorax: No chest wall tenderness  GI/Abd: Soft, NT/ND, no rebound/guarding, no masses palpated  Vascular: All 4 extremities warm, B/l radial pulses palpable, b/l femoral pulse palpable, RLE pop dopplerable, RLE DP/PT dopplerable, LLE DP palpable, PT dopplerable, no appreciable pulsatile mass in L groin, no significant swelling or ecchymosis in L groin, L groin soft   Skin: Intact, no breakdown  Musculoskeletal: All 4 extremities moving spontaneously, no limitations, sensorimotor intact b/l LE   --------------------------------------------------------------------------------------------    LABS  CBC (01-05 @ 08:48)                              8.1<L>                         13.15<H>  )----------------(  238        --    % Neutrophils, --    % Lymphocytes, ANC: --                                  25.6<L>    BMP (01-05 @ 08:48)             138     |  100     |  30<H> 		Ca++ --      Ca 9.5                ---------------------------------( 142<H>		Mg --                 4.4     |  21<L>   |  1.61<H>			Ph --                  --------------------------------------------------------------------------------------------    MICROBIOLOGY  Urinalysis (01-05 @ 08:48):     Color:  / Appearance:  / SG:  / pH:  / Gluc: 142<H> / Ketones:  / Bili:  / Urobili:  / Protein : / Nitrites:  / Leuk.Est:  / RBC:  / WBC:  / Sq Epi:  / Non Sq Epi:  / Bacteria          --------------------------------------------------------------------------------------------    IMAGING  < from: CT Abdomen and Pelvis No Cont (01.05.24 @ 17:11) >    INTERPRETATION:  Soft tissue edema is seen in the left inguinal/groin   region with a small hematoma. There is small volume of free fluid in the   left pelvis, with Hounsfield density of 40 units, concerning for blood   products in setting of recent procedure.   Cholelithiasis with multiple   gallstones measuring up to 2.4cm. Hyperattenuation within the gallbladder   lumen represents vicarious excretion of IV contrast. Stable bilateral   adrenal gland thickening    Findings d/w DEBBIE Rosenbaum by Dr Henriquez at 6:09 pm on 1/5/2024.        ******PRELIMINARY REPORT******      ******PRELIMINARY REPORT******        SALLIE HENRIQUEZ MD; Resident Radiologist  This document is a PRELIMINARY interpretation and is pending final   attending approval.Jan 5 2024  6:13PM    < end of copied text >

## 2024-01-05 NOTE — CONSULT NOTE ADULT - ASSESSMENT
78F PMHx Bladder Ca s/p surgery and chemotherapy, cerebral artery aneurysm s/p stent/embo, SC artery stent, R BC stent, mod-severe AS, CAD s/p LHC 1/4/2023 w MARYJO x 2 course cb JEFRY and Hgb 10.1-->8.1. CT A/P non con obtained by [facility], demonstrating L groin hematoma and small volume pelvic free fluid consistent w blood product. S/p 1 U PRBC i/s/o downtrending H/H. Vascular Surgery consulted to evaluate.     Patient is HDS. Exam w/o evidence of pseudoaneurysm, significant groin swelling, or distal malperfusion of LLE.     Plan:   -No acute vascular surgery intervention   -F/u official CT report   -Recommend Arterial Duplex LLE to r/o pseudoaneurysm   -Please notify vascular sx if HD Unstable   -Can obtain STAT CTA if HD Unstable   -Agree w Post Tsf CBC   -Maintain T/Sx2 and large bore IV access x2   -Transfusion and global care per [facility]   -Vasc to follow     Discussed with Fellow on behalf of Attending Surgeon Dr. Abhi Quesada MD PGY3  Vascular, [id number]  78F PMHx Bladder Ca s/p surgery and chemotherapy, cerebral artery aneurysm s/p stent/embo, SC artery stent, R BC stent, mod-severe AS, CAD s/p LHC 1/4/2023 w MARYJO x 2 course cb JEFRY and Hgb 10.1-->8.1. CT A/P non con obtained by [facility], demonstrating L groin hematoma and small volume pelvic free fluid consistent w blood product. S/p 1 U PRBC i/s/o downtrending H/H. Vascular Surgery consulted to evaluate.     Patient is HDS. Exam w/o evidence of pseudoaneurysm, significant groin swelling, or distal malperfusion of LLE.     Plan:   -No acute vascular surgery intervention   -F/u official CT report   -Recommend Arterial Duplex LLE to r/o pseudoaneurysm   -Please notify vascular sx if HD Unstable   -Can obtain STAT CTA if HD Unstable   -Agree w Post Tsf CBC   -Maintain T/Sx2 and large bore IV access x2   -Transfusion and global care per [facility]   -Vasc to follow     Discussed with Fellow on behalf of Attending Surgeon Dr. Abhi Quesada MD PGY3  Vascular, [id number]  78F PMHx Bladder Ca s/p surgery and chemotherapy, cerebral artery aneurysm s/p stent/embo, SC artery stent, R BC stent, mod-severe AS, CAD s/p LHC 1/4/2023 w MARYJO x 2 course cb JEFRY and Hgb 10.1-->8.1. CT A/P non con obtained by [facility], demonstrating L groin hematoma and small volume pelvic free fluid consistent w blood product. S/p 1 U PRBC i/s/o downtrending H/H. Vascular Surgery consulted to evaluate.     Patient is HDS. Exam w/o evidence of pseudoaneurysm, significant groin swelling, or distal malperfusion of LLE.     Plan:   -No acute vascular surgery intervention   -F/u official CT report   -Recommend Arterial Duplex LLE to r/o pseudoaneurysm   -Please notify vascular sx if HD Unstable   -Can obtain CTA if concern for active bleeding  -Agree w Post Tsf CBC   -Maintain T/Sx2 and large bore IV access x2   -Transfusion and global care per [facility]   -Vasc to follow     Discussed with Fellow on behalf of Attending Surgeon Dr. Abhi Quesada MD PGY3  Vascular, [id number]  78F PMHx Bladder Ca s/p surgery and chemotherapy, cerebral artery aneurysm s/p stent/embo, SC artery stent, R BC stent, mod-severe AS, CAD s/p LHC 1/4/2023 w MARYJO x 2 course cb JEFRY and Hgb 10.1-->8.1. CT A/P non con obtained by [facility], demonstrating L groin hematoma and small volume pelvic free fluid consistent w blood product. S/p 1 U PRBC i/s/o downtrending H/H. Vascular Surgery consulted to evaluate.     Patient is HDS. Exam w/o evidence of pseudoaneurysm, significant groin swelling, or distal malperfusion of LLE.     Plan:   -No acute vascular surgery intervention   -F/u official CT report   -Recommend Arterial Duplex LLE to r/o pseudoaneurysm   -Please notify vascular sx if HD Unstable   -Can obtain CTA if concern for active bleeding  -Agree w Post Tsf CBC   -Maintain T/Sx2 and large bore IV access x2   -Transfusion and global care per [facility]   -Vasc to follow     Discussed with Fellow on behalf of Attending Surgeon Dr. Abhi Quesada MD PGY3  Vascular, [id number]

## 2024-01-05 NOTE — PROGRESS NOTE ADULT - SUBJECTIVE AND OBJECTIVE BOX
[facility] INVASIVE CARDIOLOGY- (Anastasiya Tolbert, Torey, Erlin, Jason, Juan, Ayo, Zeyad, Herb)   CARDIAC [facility], [facility] TEAM   [phone]      CHIEF COMPLAINT: Patient is a 78y old  Female who presents with a chief complaint of   HPI:  78 years ago w pmh of Bladder CA w surgery and chemo, R wrist fracture w screws/plate, cerebral artery embolization w endovascular coiling of a brain aneurysm w stents ( was on brilinta until 6/2023), subclavian artery stent, R brachiocephalic stent who presents for a [facility] with Dr. Langford. Pt states she had a CT angiogram for a work up of her mod-severe AS. In the CT angio they found agatston score of 1628 corresponding to the 98 percentile. Pt referred by Dr. Espinal for a [facility]. She states she is having no symptoms of chest pain, SOB, numbness/tingling , dizziness.  (04 Jan 2024 08:41)      Subjective/Observations:   c/o lightheadedness and nausea - which since have been improved  Denies chest pain, SOB, palpitation, N/V  No pain, numbness, tingling or swelling around the access site when resting  reports soreness with moderate palpation   Reports she had platelet testing on Plavix and was told that she's not responding and was on Brilinta which was stopped in June     MEDICATIONS  (STANDING):  aspirin  chewable 81 milliGRAM(s) Oral daily  enalapril 20 milliGRAM(s) Oral daily  furosemide    Tablet 20 milliGRAM(s) Oral daily  labetalol 100 milliGRAM(s) Oral every 12 hours  ondansetron Injectable 4 milliGRAM(s) IV Push once  sodium chloride 0.9%. 1000 milliLiter(s) (75 mL/Hr) IV Continuous <Continuous>  sodium chloride 0.9%. 1000 milliLiter(s) (75 mL/Hr) IV Continuous <Continuous>  ticagrelor 90 milliGRAM(s) Oral every 12 hours    MEDICATIONS  (PRN):      Allergies    No Known Allergies    Intolerances    statins (Other)      Vital Signs Last 24 Hrs  T(C): 36.8 (04 Jan 2024 23:14), Max: 36.8 (04 Jan 2024 23:14)  T(F): 98.2 (04 Jan 2024 23:14), Max: 98.2 (04 Jan 2024 23:14)  HR: 71 (04 Jan 2024 23:14) (54 - 84)  BP: 116/53 (04 Jan 2024 23:14) (91/54 - 156/68)  BP(mean): 77 (04 Jan 2024 23:14) (68 - 98)  RR: 16 (04 Jan 2024 23:14) (16 - 18)  SpO2: 100% (04 Jan 2024 23:14) (93% - 100%)    Parameters below as of 04 Jan 2024 23:14  Patient On (Oxygen Delivery Method): room air        FOCUSED PHYSICAL EXAM:  Cardiovascular: Regular, S1 and S2, No murmurs, rubs, gallops or clicks  Pulmonary: Non-labored, breath sounds are clear bilaterally, No wheezing, rales or rhonchi  cath site:  Left femoral access site - stable without edema, bleeding or hematoma. soft, + pulses. Mild ecchymosis     LABS: All Labs Reviewed:                        10.1   7.19  )-----------( 243      ( 04 Jan 2024 08:03 )             32.2     04 Jan 2024 08:03    140    |  102    |  30     ----------------------------<  110    4.5     |  22     |  1.39     Ca    10.3       04 Jan 2024 08:03          RESULTS:    CATH REPORT:  < from: Cardiac Catheterization (01.04.24 @ 10:56) >  Cath Lab Report    Diagnostic Cardiologist:       Chris Langford MD   Interventional Cardiologist:   Chris Langford MD   Fellow:                        Jamil Narayanan, Fellow   Fellow:                        Walker Barry, Fellow   Referring Physician:           Cuong Espinal MD     Procedures Performed   Procedures:               1.    Arterial Access - Left Femoral     2.    Diagnostic Coronary Angiography   3.    Ultrasound Guided Access   4.    PCI: Orbital Atherectomy   5.    IVUS   6.    PCI: MARYJO     Indications:               Abnormal CTA   PCI Status:               elective     Conclusions:   1. Severe atherosclerosis of the pLAD s/p PCI with CSI and MARYJO x 2     Recommendations:     Continue DAPT and risk factor modification.     < end of copied text >          [facility] INVASIVE CARDIOLOGY- (Anastasiya Tolbert, Torey, Erlin, Jason, Juan, Ayo, Zeyad, Herb)   CARDIAC [facility], [facility] TEAM   [phone]      CHIEF COMPLAINT: Patient is a 78y old  Female who presents with a chief complaint of   HPI:  78 years ago w pmh of Bladder CA w surgery and chemo, R wrist fracture w screws/plate, cerebral artery embolization w endovascular coiling of a brain aneurysm w stents ( was on brilinta until 6/2023), subclavian artery stent, R brachiocephalic stent who presents for a [facility] with Dr. Langford. Pt states she had a CT angiogram for a work up of her mod-severe AS. In the CT angio they found agatston score of 1628 corresponding to the 98 percentile. Pt referred by Dr. Espinal for a [facility]. She states she is having no symptoms of chest pain, SOB, numbness/tingling , dizziness.  (04 Jan 2024 08:41)      Subjective/Observations:   c/o lightheadedness and nausea - which since have been improved  Denies chest pain, SOB, palpitation, N/V  No pain, numbness, tingling or swelling around the access site when resting  reports soreness with moderate palpation   Reports she had platelet testing on Plavix and was told that she's not responding and was on Brilinta which was stopped in June     MEDICATIONS  (STANDING):  aspirin  chewable 81 milliGRAM(s) Oral daily  enalapril 20 milliGRAM(s) Oral daily  furosemide    Tablet 20 milliGRAM(s) Oral daily  labetalol 100 milliGRAM(s) Oral every 12 hours  ondansetron Injectable 4 milliGRAM(s) IV Push once  sodium chloride 0.9%. 1000 milliLiter(s) (75 mL/Hr) IV Continuous <Continuous>  sodium chloride 0.9%. 1000 milliLiter(s) (75 mL/Hr) IV Continuous <Continuous>  ticagrelor 90 milliGRAM(s) Oral every 12 hours    MEDICATIONS  (PRN):      Allergies    No Known Allergies    Intolerances    statins (Other)      Vital Signs Last 24 Hrs  T(C): 36.8 (04 Jan 2024 23:14), Max: 36.8 (04 Jan 2024 23:14)  T(F): 98.2 (04 Jan 2024 23:14), Max: 98.2 (04 Jan 2024 23:14)  HR: 71 (04 Jan 2024 23:14) (54 - 84)  BP: 116/53 (04 Jan 2024 23:14) (91/54 - 156/68)  BP(mean): 77 (04 Jan 2024 23:14) (68 - 98)  RR: 16 (04 Jan 2024 23:14) (16 - 18)  SpO2: 100% (04 Jan 2024 23:14) (93% - 100%)    Parameters below as of 04 Jan 2024 23:14  Patient On (Oxygen Delivery Method): room air        FOCUSED PHYSICAL EXAM:  Cardiovascular: Regular, S1 and S2, No murmurs, rubs, gallops or clicks  Pulmonary: Non-labored, breath sounds are clear bilaterally, No wheezing, rales or rhonchi  cath site:  Left femoral access site - stable without edema, bleeding or hematoma. soft, + pulses. Mild ecchymosis     LABS: All Labs Reviewed:                        10.1   7.19  )-----------( 243      ( 04 Jan 2024 08:03 )             32.2     04 Jan 2024 08:03    140    |  102    |  30     ----------------------------<  110    4.5     |  22     |  1.39     Ca    10.3       04 Jan 2024 08:03          RESULTS:    CATH REPORT:  < from: Cardiac Catheterization (01.04.24 @ 10:56) >  Cath Lab Report    Diagnostic Cardiologist:       Chris Langford MD   Interventional Cardiologist:   Chris Langford MD   Fellow:                        Jamil Narayanan, Fellow   Fellow:                        Walker Barry, Fellow   Referring Physician:           Cuong Espinal MD     Procedures Performed   Procedures:               1.    Arterial Access - Left Femoral     2.    Diagnostic Coronary Angiography   3.    Ultrasound Guided Access   4.    PCI: Orbital Atherectomy   5.    IVUS   6.    PCI: MARYJO     Indications:               Abnormal CTA   PCI Status:               elective     Conclusions:   1. Severe atherosclerosis of the pLAD s/p PCI with CSI and MARYJO x 2     Recommendations:     Continue DAPT and risk factor modification.     < end of copied text >

## 2024-01-05 NOTE — PROGRESS NOTE ADULT - ASSESSMENT
78F with PMH of Bladder CA w surgery and chemo, R wrist fracture w screws/plate, cerebral artery embolization w endovascular coiling of a brain aneurysm w stents (was on brilinta until 6/2023), subclavian artery stent, R brachiocephalic stent with high Calcium score on CCTA is now s/p C with PCI.    # CAD   s/p [facility] - pLAD s/p PCI with CSI and MARYJO x 2 via LFA with Dr. Langford on 1/4/24      - Right radial access site is stable without bleeding, or hematoma.    - Continue close monitoring of radial/femoral access site and clinical status     - Continue Aspirin and Brilinta    - Pending price check for Brilinta - patient states she needs "coupon" for Brilinta      - Keep K > 4 and Mg > 2    # HTN    - Soft BP    - Received gentle IVF    - May need to hold antihypertensive meds pending repeat BP in AM    - On Enalapril 20mg QD and Labetalol 100 mg BID with holding parameters     - DASH diet    # HLD    - Reports intolerance of Statin    - To be followed up as outpatient     - DASH diet     # Nausea and dizziness    - Trial of IVF, extended bedrest, and IV Zofran with symptoms improvement.     - Check Orthostatics in AM    - Fall precautions     # Dispo   - Likely D/C in AM pending price check for Brilinta and Orthostatics, clinical status    - Patient to follow up with Cards in 2 weeks post discharge     78F with PMH of Bladder CA w surgery and chemo, R wrist fracture w screws/plate, cerebral artery embolization w endovascular coiling of a brain aneurysm w stents (was on brilinta until 6/2023), subclavian artery stent, R brachiocephalic stent with high Calcium score on CCTA is now s/p C with PCI.    # CAD   s/p [facility] - pLAD s/p PCI with CSI and MARYJO x 2 via LFA with Dr. Langford on 1/4/24      - Right radial access site is stable without bleeding, or hematoma.    - Continue close monitoring of radial/femoral access site and clinical status     - Continue Aspirin and Brilinta    - Pending price check for Brilinta - patient states she needs "coupon" for Brilinta      - Keep K > 4 and Mg > 2    # HTN    - Soft BP    - Received gentle IVF    - May need to hold antihypertensive meds pending repeat BP in AM    - On Enalapril 20mg QD and Labetalol 100 mg BID with holding parameters     - DASH diet    # HLD    - Reports intolerance of Statin    - To be followed up as outpatient     - DASH diet     # Nausea and dizziness    - Trial of IVF, extended bedrest, and IV Zofran with symptoms improvement.     - Check Orthostatics in AM    - Fall precautions     # Dispo   - Likely D/C in AM pending price check for Brilinta and Orthostatics, clinical status    - Patient to follow up with Cards in 2 weeks post discharge     78F with PMH of Bladder CA w surgery and chemo, R wrist fracture w screws/plate, cerebral artery embolization w endovascular coiling of a brain aneurysm w stents (was on brilinta until 6/2023), subclavian artery stent, R brachiocephalic stent with high Calcium score on CCTA is now s/p C with PCI.    # CAD   s/p [facility] - pLAD s/p PCI with CSI and MARYJO x 2 via LFA with Dr. Langford on 1/4/24      - Right radial access site is stable without bleeding, or hematoma.    - Continue close monitoring of radial/femoral access site and clinical status     - Continue Aspirin and Brilinta    - Pending price check for Brilinta - patient states she needs "coupon" for Brilinta      - Keep K > 4 and Mg > 2    - Reports intolerance of Statin, to be followed up as outpatient     # HTN    - Soft BP    - Received gentle IVF    - May need to hold antihypertensive meds pending repeat BP in AM    - On Enalapril 20mg QD and Labetalol 100 mg BID with holding parameters     - DASH diet    # HLD      - DASH diet     # Nausea and dizziness    - Trial of IVF, extended bedrest, and IV Zofran with symptoms improvement.     - Check Orthostatics in AM    - Fall precautions     # Dispo   - Likely D/C in AM pending price check for Brilinta and Orthostatics, clinical status    - Patient to follow up with Cards in 2 weeks post discharge     78F with PMH of Bladder CA w surgery and chemo, R wrist fracture w screws/plate, cerebral artery embolization w endovascular coiling of a brain aneurysm w stents (was on brilinta until 6/2023), subclavian artery stent, R brachiocephalic stent with high Calcium score on CCTA is now s/p C with PCI.    # CAD   s/p [facility] - pLAD s/p PCI with CSI and MARYJO x 2 via LFA with Dr. Langford on 1/4/24      - Right radial access site is stable without bleeding, or hematoma.    - Continue close monitoring of radial/femoral access site and clinical status     - Continue Aspirin and Brilinta    - Pending price check for Brilinta - patient states she needs "coupon" for Brilinta      - Keep K > 4 and Mg > 2    - Reports intolerance of Statin, to be followed up as outpatient     # HTN    - Soft BP    - Received gentle IVF    - May need to hold antihypertensive meds pending repeat BP in AM    - On Enalapril 20mg QD and Labetalol 100 mg BID with holding parameters     - DASH diet    # HLD      - DASH diet     # Nausea and dizziness    - Trial of IVF, extended bedrest, and IV Zofran with symptoms improvement.     - Check Orthostatics in AM    - Fall precautions     # Dispo   - Likely D/C in AM pending price check for Brilinta and Orthostatics, clinical status    - Patient to follow up with Cards in 2 weeks post discharge

## 2024-01-06 ENCOUNTER — TRANSCRIPTION ENCOUNTER (OUTPATIENT)
Age: 79
End: 2024-01-06

## 2024-01-06 VITALS
RESPIRATION RATE: 17 BRPM | OXYGEN SATURATION: 97 % | SYSTOLIC BLOOD PRESSURE: 121 MMHG | DIASTOLIC BLOOD PRESSURE: 58 MMHG | TEMPERATURE: 98 F | HEART RATE: 62 BPM

## 2024-01-06 LAB
ANION GAP SERPL CALC-SCNC: 11 MMOL/L — SIGNIFICANT CHANGE UP (ref 5–17)
ANION GAP SERPL CALC-SCNC: 11 MMOL/L — SIGNIFICANT CHANGE UP (ref 5–17)
BUN SERPL-MCNC: 23 MG/DL — SIGNIFICANT CHANGE UP (ref 7–23)
BUN SERPL-MCNC: 23 MG/DL — SIGNIFICANT CHANGE UP (ref 7–23)
CALCIUM SERPL-MCNC: 9.2 MG/DL — SIGNIFICANT CHANGE UP (ref 8.4–10.5)
CALCIUM SERPL-MCNC: 9.2 MG/DL — SIGNIFICANT CHANGE UP (ref 8.4–10.5)
CHLORIDE SERPL-SCNC: 99 MMOL/L — SIGNIFICANT CHANGE UP (ref 96–108)
CHLORIDE SERPL-SCNC: 99 MMOL/L — SIGNIFICANT CHANGE UP (ref 96–108)
CO2 SERPL-SCNC: 24 MMOL/L — SIGNIFICANT CHANGE UP (ref 22–31)
CO2 SERPL-SCNC: 24 MMOL/L — SIGNIFICANT CHANGE UP (ref 22–31)
CREAT SERPL-MCNC: 1.39 MG/DL — HIGH (ref 0.5–1.3)
CREAT SERPL-MCNC: 1.39 MG/DL — HIGH (ref 0.5–1.3)
EGFR: 39 ML/MIN/1.73M2 — LOW
EGFR: 39 ML/MIN/1.73M2 — LOW
GLUCOSE SERPL-MCNC: 132 MG/DL — HIGH (ref 70–99)
GLUCOSE SERPL-MCNC: 132 MG/DL — HIGH (ref 70–99)
HCT VFR BLD CALC: 27.9 % — LOW (ref 34.5–45)
HCT VFR BLD CALC: 27.9 % — LOW (ref 34.5–45)
HCT VFR BLD CALC: 28.2 % — LOW (ref 34.5–45)
HCT VFR BLD CALC: 28.2 % — LOW (ref 34.5–45)
HGB BLD-MCNC: 9 G/DL — LOW (ref 11.5–15.5)
MCHC RBC-ENTMCNC: 27.3 PG — SIGNIFICANT CHANGE UP (ref 27–34)
MCHC RBC-ENTMCNC: 27.3 PG — SIGNIFICANT CHANGE UP (ref 27–34)
MCHC RBC-ENTMCNC: 27.5 PG — SIGNIFICANT CHANGE UP (ref 27–34)
MCHC RBC-ENTMCNC: 27.5 PG — SIGNIFICANT CHANGE UP (ref 27–34)
MCHC RBC-ENTMCNC: 31.9 GM/DL — LOW (ref 32–36)
MCHC RBC-ENTMCNC: 31.9 GM/DL — LOW (ref 32–36)
MCHC RBC-ENTMCNC: 32.3 GM/DL — SIGNIFICANT CHANGE UP (ref 32–36)
MCHC RBC-ENTMCNC: 32.3 GM/DL — SIGNIFICANT CHANGE UP (ref 32–36)
MCV RBC AUTO: 85.3 FL — SIGNIFICANT CHANGE UP (ref 80–100)
MCV RBC AUTO: 85.3 FL — SIGNIFICANT CHANGE UP (ref 80–100)
MCV RBC AUTO: 85.5 FL — SIGNIFICANT CHANGE UP (ref 80–100)
MCV RBC AUTO: 85.5 FL — SIGNIFICANT CHANGE UP (ref 80–100)
NRBC # BLD: 0 /100 WBCS — SIGNIFICANT CHANGE UP (ref 0–0)
PLATELET # BLD AUTO: 205 K/UL — SIGNIFICANT CHANGE UP (ref 150–400)
PLATELET # BLD AUTO: 205 K/UL — SIGNIFICANT CHANGE UP (ref 150–400)
PLATELET # BLD AUTO: 211 K/UL — SIGNIFICANT CHANGE UP (ref 150–400)
PLATELET # BLD AUTO: 211 K/UL — SIGNIFICANT CHANGE UP (ref 150–400)
POTASSIUM SERPL-MCNC: 4.5 MMOL/L — SIGNIFICANT CHANGE UP (ref 3.5–5.3)
POTASSIUM SERPL-MCNC: 4.5 MMOL/L — SIGNIFICANT CHANGE UP (ref 3.5–5.3)
POTASSIUM SERPL-SCNC: 4.5 MMOL/L — SIGNIFICANT CHANGE UP (ref 3.5–5.3)
POTASSIUM SERPL-SCNC: 4.5 MMOL/L — SIGNIFICANT CHANGE UP (ref 3.5–5.3)
RBC # BLD: 3.27 M/UL — LOW (ref 3.8–5.2)
RBC # BLD: 3.27 M/UL — LOW (ref 3.8–5.2)
RBC # BLD: 3.3 M/UL — LOW (ref 3.8–5.2)
RBC # BLD: 3.3 M/UL — LOW (ref 3.8–5.2)
RBC # FLD: 14.1 % — SIGNIFICANT CHANGE UP (ref 10.3–14.5)
RBC # FLD: 14.1 % — SIGNIFICANT CHANGE UP (ref 10.3–14.5)
RBC # FLD: 14.6 % — HIGH (ref 10.3–14.5)
RBC # FLD: 14.6 % — HIGH (ref 10.3–14.5)
SODIUM SERPL-SCNC: 134 MMOL/L — LOW (ref 135–145)
SODIUM SERPL-SCNC: 134 MMOL/L — LOW (ref 135–145)
WBC # BLD: 11.15 K/UL — HIGH (ref 3.8–10.5)
WBC # BLD: 11.15 K/UL — HIGH (ref 3.8–10.5)
WBC # BLD: 11.36 K/UL — HIGH (ref 3.8–10.5)
WBC # BLD: 11.36 K/UL — HIGH (ref 3.8–10.5)
WBC # FLD AUTO: 11.15 K/UL — HIGH (ref 3.8–10.5)
WBC # FLD AUTO: 11.15 K/UL — HIGH (ref 3.8–10.5)
WBC # FLD AUTO: 11.36 K/UL — HIGH (ref 3.8–10.5)
WBC # FLD AUTO: 11.36 K/UL — HIGH (ref 3.8–10.5)

## 2024-01-06 PROCEDURE — C1753: CPT

## 2024-01-06 PROCEDURE — 93454 CORONARY ARTERY ANGIO S&I: CPT | Mod: 59

## 2024-01-06 PROCEDURE — C1724: CPT

## 2024-01-06 PROCEDURE — 86850 RBC ANTIBODY SCREEN: CPT

## 2024-01-06 PROCEDURE — C1894: CPT

## 2024-01-06 PROCEDURE — 92978 ENDOLUMINL IVUS OCT C 1ST: CPT | Mod: LD

## 2024-01-06 PROCEDURE — C1887: CPT

## 2024-01-06 PROCEDURE — 85027 COMPLETE CBC AUTOMATED: CPT

## 2024-01-06 PROCEDURE — 86923 COMPATIBILITY TEST ELECTRIC: CPT

## 2024-01-06 PROCEDURE — 93005 ELECTROCARDIOGRAM TRACING: CPT

## 2024-01-06 PROCEDURE — 86901 BLOOD TYPING SEROLOGIC RH(D): CPT

## 2024-01-06 PROCEDURE — C1769: CPT

## 2024-01-06 PROCEDURE — P9016: CPT

## 2024-01-06 PROCEDURE — 80048 BASIC METABOLIC PNL TOTAL CA: CPT

## 2024-01-06 PROCEDURE — 86900 BLOOD TYPING SEROLOGIC ABO: CPT

## 2024-01-06 PROCEDURE — 36415 COLL VENOUS BLD VENIPUNCTURE: CPT

## 2024-01-06 PROCEDURE — 36430 TRANSFUSION BLD/BLD COMPNT: CPT

## 2024-01-06 PROCEDURE — 74176 CT ABD & PELVIS W/O CONTRAST: CPT

## 2024-01-06 PROCEDURE — C9602: CPT | Mod: LD

## 2024-01-06 PROCEDURE — C1725: CPT

## 2024-01-06 PROCEDURE — 93926 LOWER EXTREMITY STUDY: CPT

## 2024-01-06 PROCEDURE — C1874: CPT

## 2024-01-06 RX ORDER — ASPIRIN/CALCIUM CARB/MAGNESIUM 324 MG
1 TABLET ORAL
Qty: 0 | Refills: 0 | DISCHARGE
Start: 2024-01-06

## 2024-01-06 RX ORDER — ACETAMINOPHEN 500 MG
2 TABLET ORAL
Qty: 0 | Refills: 0 | DISCHARGE
Start: 2024-01-06

## 2024-01-06 RX ADMIN — Medication 20 MILLIGRAM(S): at 07:56

## 2024-01-06 RX ADMIN — LIDOCAINE 1 PATCH: 4 CREAM TOPICAL at 07:41

## 2024-01-06 RX ADMIN — Medication 100 MILLIGRAM(S): at 05:36

## 2024-01-06 RX ADMIN — Medication 20 MILLIGRAM(S): at 05:35

## 2024-01-06 RX ADMIN — TICAGRELOR 90 MILLIGRAM(S): 90 TABLET ORAL at 05:36

## 2024-01-06 RX ADMIN — Medication 81 MILLIGRAM(S): at 05:36

## 2024-01-06 NOTE — PROGRESS NOTE ADULT - SUBJECTIVE AND OBJECTIVE BOX
VASCULAR SURGERY PROGRESS NOTE    S: Patient doing well, no acute events overnight. Duplex showing no PSA. H/H stable overnight.    O: Vital Signs  T(C): 36.6 (01-06 @ 07:57), Max: 36.9 (01-05 @ 15:00)  HR: 68 (01-06 @ 07:57) (68 - 87)  BP: 133/59 (01-06 @ 07:57) (101/54 - 164/70)  RR: 17 (01-06 @ 07:57) (16 - 17)  SpO2: 98% (01-06 @ 07:57) (93% - 98%)    General: alert and oriented, NAD  Resp: airway patent, respirations unlabored  CVS: regular rate and rhythm  Abdomen: soft, nontender, nondistended, groins soft b/l  Extremities: no edema  Skin: warm, dry, appropriate color                          9.0    11.36 )-----------( 205      ( 06 Jan 2024 05:05 )             28.2   01-06    134<L>  |  99  |  23  ----------------------------<  132<H>  4.5   |  24  |  1.39<H>    Ca    9.2      06 Jan 2024 00:58

## 2024-01-06 NOTE — DISCHARGE NOTE NURSING/CASE MANAGEMENT/SOCIAL WORK - NSDCPEFALRISK_GEN_ALL_CORE
For information on Fall & Injury Prevention, visit: [email or web address] OR  [email or web address] OR  https://www.cdc.gov/steadi/patient.html For information on Fall & Injury Prevention, visit: [email or web address] OR  [email or web address] OR  https://www.cdc.gov/steadi/patient.html

## 2024-01-06 NOTE — DISCHARGE NOTE NURSING/CASE MANAGEMENT/SOCIAL WORK - PATIENT PORTAL LINK FT
You can access the FollowMyHealth Patient Portal offered by [facility] by registering at the following website: [email or web address]. By joining [employer]’s FollowMyHealth portal, you will also be able to view your health information using other applications (apps) compatible with our system. You can access the FollowMyHealth Patient Portal offered by [facility] by registering at the following website: [email or web address]. By joining [employer]’s FollowMyHealth portal, you will also be able to view your health information using other applications (apps) compatible with our system.

## 2024-01-06 NOTE — PROGRESS NOTE ADULT - ASSESSMENT
CAD  s/p stent to LAD  dapt  statin   BB    HTN  stable    anemia  s/p transfusion   stable  fu with CT official result  surgery input appreciated     aortic stenosis  mod to severe  stable  CT calcium score not consistent with severe AS

## 2024-01-06 NOTE — PROGRESS NOTE ADULT - SUBJECTIVE AND OBJECTIVE BOX
Subjective: Patient seen and examined. No new events except as noted.     SUBJECTIVE/ROS:  No chest pain, dyspnea, palpitation, or dizziness.       MEDICATIONS:  MEDICATIONS  (STANDING):  acetaminophen     Tablet .. 650 milliGRAM(s) Oral once  aspirin  chewable 81 milliGRAM(s) Oral daily  enalapril 20 milliGRAM(s) Oral daily  furosemide    Tablet 20 milliGRAM(s) Oral daily  labetalol 100 milliGRAM(s) Oral every 12 hours  sodium chloride 0.9%. 1000 milliLiter(s) (75 mL/Hr) IV Continuous <Continuous>  sodium chloride 0.9%. 1000 milliLiter(s) (75 mL/Hr) IV Continuous <Continuous>  ticagrelor 90 milliGRAM(s) Oral every 12 hours      PHYSICAL EXAM:  T(C): 36.6 (01-06-24 @ 07:57), Max: 36.9 (01-05-24 @ 15:00)  HR: 68 (01-06-24 @ 07:57) (68 - 87)  BP: 133/59 (01-06-24 @ 07:57) (101/54 - 164/70)  RR: 17 (01-06-24 @ 07:57) (16 - 17)  SpO2: 98% (01-06-24 @ 07:57) (93% - 98%)  Wt(kg): --  I&O's Summary    05 Jan 2024 07:01  -  06 Jan 2024 07:00  --------------------------------------------------------  IN: 480 mL / OUT: 0 mL / NET: 480 mL            JVP: Normal  Neck: supple  Lung: clear   CV: S1 S2 , Murmur:  Abd: soft  Ext: No edema  neuro: Awake / alert  Psych: flat affect  Skin: normal``    LABS/DATA:    CARDIAC MARKERS:                                9.0    11.36 )-----------( 205      ( 06 Jan 2024 05:05 )             28.2     01-06    134<L>  |  99  |  23  ----------------------------<  132<H>  4.5   |  24  |  1.39<H>    Ca    9.2      06 Jan 2024 00:58      proBNP:   Lipid Profile:   HgA1c:   TSH:     TELE:  EKG:

## 2024-01-06 NOTE — PROGRESS NOTE ADULT - ASSESSMENT
78F PMHx Bladder Ca s/p surgery and chemotherapy, cerebral artery aneurysm s/p stent/embo, SC artery stent, R BC stent, mod-severe AS, CAD s/p [facility] 1/4/2023 w MARYJO x 2 course cb JEFRY and Hgb 10.1-->8.1. CT A/P non con obtained by [facility], demonstrating L groin hematoma and small volume pelvic free fluid consistent w blood product. S/p 1 U PRBC i/s/o downtrending H/H. Vascular Surgery consulted to evaluate.     Patient is HDS. Exam w/o evidence of pseudoaneurysm, significant groin swelling, or distal malperfusion of LLE.     Arterial Duplex showing no pseudoaneurysm. Exam stable on AM rounds. H/H stable. Hemodynamically stable.     Please call back with any questions or concerns.     Vascular, [id number]  78F PMHx Bladder Ca s/p surgery and chemotherapy, cerebral artery aneurysm s/p stent/embo, SC artery stent, R BC stent, mod-severe AS, CAD s/p LHC 1/4/2023 w MARYJO x 2 course cb JEFRY and Hgb 10.1-->8.1. CT A/P non con obtained by [facility], demonstrating L groin hematoma and small volume pelvic free fluid consistent w blood product. S/p 1 U PRBC i/s/o downtrending H/H. Vascular Surgery consulted to evaluate.     Patient is HDS. Exam w/o evidence of pseudoaneurysm, significant groin swelling, or distal malperfusion of LLE.     Arterial Duplex showing no pseudoaneurysm. Exam stable on AM rounds. H/H stable. Hemodynamically stable.     Please call back with any questions or concerns.     Vascular, [id number]

## 2024-02-14 NOTE — PATIENT PROFILE ADULT - NSPROEXTENSIONSOFSELF_GEN_A_NUR
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     NEURO:  Neurochecks Q2 hours  MRI spine with cord lesions at C3 and C4 with nerve root enhancement in the L spine  s/p EEG with mild diffuse/multi-focal cerebral dysfunction, not specific as to etiology. No epileptiform abnormalities.    Will follow up rest of the autoimmune/infectious panel   f/u LP studies including CSF PCR, ACE, Lyme, crypto, CMV, West Nile, VDRL, CSF autoimmune encephalitis panel   send GI PCR  f/u anti-GQ1b and ganglioside Abs, ACE serum, serum autoimmune encephalitis panel   CSF WBC 11, protein 67, Glucose 83  S/p Plex 2/12, next PLEX 2/14. (plan for 5 sessions)  C/W ASA   Seen by neurology   Activity: [] mobilize as tolerated [] Bedrest [] PT [] OT [] PMNR    PULM:  Intubated for airway protection  16/500/5/40%- ABG reviewed, will decrease FiO2 to 30%  Patient may need early trach - continue discussion with family   C/w hypertonic nebs and chest PT   f/u CXR     CV:   SBP goal  with MAP >65   Titrate genny to goal - PICC c/s for pressors   Continue ASA   TTE- EF 66%  R- axillary A line     RENAL:  Fluids: IVL if tolerating diet    GI:  Diet: TF AT goal  Vitamin D supplementation   GI prophylaxis [] not indicated [x] PPI [] other:  Bowel regimen [x] colace [x] senna, dulcolax suppository   LBM PTA     ENDO:   Goal euglycemia (-180)  A1c 6.8    HEME/ONC:  H/H stable   VTE prophylaxis: [x] SCDs [x] chemoprophylaxis- SQL [] hold chemoprophylaxis due to: [] high risk of DVT/PE on admission due to:    ID:  Afebrile, improved leukocytosis     MISC:  Lines/tubes: LIBORIO CAMPOS foley     SOCIAL/FAMILY:  [] awaiting [x] updated at bedside [] family meeting    CODE STATUS:  [x] Full Code [] DNR [] DNI [] Palliative/Comfort Care    DISPOSITION:  [x] ICU [] Stroke Unit [] Floor [] EMU [] RCU [] PCU    [x] Patient is at high risk of neurologic deterioration/death due to: infection    ASSESSMENT/PLAN: 71 y/o male with multiple comorbidities presenting initially with suspicious of basilar stroke however, 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     NEURO:  Neurochecks Q2 hours  MRI spine with cord lesions at C3 and C4 with nerve root enhancement in the L spine  s/p EEG with mild diffuse/multi-focal cerebral dysfunction, not specific as to etiology. No epileptiform abnormalities.    Will follow up rest of the autoimmune/infectious panel   f/u LP studies including CSF PCR, ACE, Lyme, crypto, CMV, West Nile, VDRL, CSF autoimmune encephalitis panel   send GI PCR  f/u anti-GQ1b and ganglioside Abs, ACE serum, serum autoimmune encephalitis panel   CSF WBC 11, protein 67, Glucose 83  S/p Plex 2/12, next PLEX today 2/14. (plan for 5 sessions)  C/W ASA   Seen by neurology   Activity: [] mobilize as tolerated [] Bedrest [] PT [] OT [] PMNR    PULM:  Intubated for airway protection  16/500/5/40%- ABG reviewed, will decrease FiO2 to 30%  Patient may need early trach - continue discussion with family   C/w hypertonic nebs and chest PT   f/u CXR     CV:   SBP goal  with MAP >65   Titrate genny to goal - PICC c/s for pressors   Continue ASA   TTE- EF 66%  R- axillary A line     RENAL:  Fluids: IVL if tolerating diet    GI:  Diet: TF AT goal  Vitamin D supplementation   GI prophylaxis [] not indicated [x] PPI [] other:  Bowel regimen [x] colace [x] senna, dulcolax suppository   LBM PTA     ENDO:   Goal euglycemia (-180)  A1c 6.8    HEME/ONC:  H/H stable   VTE prophylaxis: [x] SCDs [x] chemoprophylaxis- SQL [] hold chemoprophylaxis due to: [] high risk of DVT/PE on admission due to:    ID:  Afebrile, improved leukocytosis     MISC:  Lines/tubes: RICHAR, R axillary ru serna     SOCIAL/FAMILY:  [] awaiting [x] updated at bedside [] family meeting    CODE STATUS:  [x] Full Code [] DNR [] DNI [] Palliative/Comfort Care    DISPOSITION:  [x] ICU [] Stroke Unit [] Floor [] EMU [] RCU [] PCU    [x] Patient is at high risk of neurologic deterioration/death due to: infection    ASSESSMENT/PLAN: 73 y/o male with multiple comorbidities presenting initially with suspicious of basilar stroke however, 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     NEURO:  Neurochecks Q2 hours  PRN oxy for pain control   Add gabapentin for possible central cause of hemodynamic instability  Wean sedation as tolerated    MRI spine with cord lesions at C3 and C4 with nerve root enhancement in the L spine  s/p EEG with mild diffuse/multi-focal cerebral dysfunction, not specific as to etiology. No epileptiform abnormalities.    Will follow up rest of the autoimmune/infectious panel   f/u LP studies including CSF PCR, ACE, Lyme, crypto, CMV, West Nile, VDRL, CSF autoimmune encephalitis panel   send GI PCR  f/u anti-GQ1b and ganglioside Abs, ACE serum, serum autoimmune encephalitis panel   CSF WBC 11, protein 67, Glucose 83  S/p Plex 2/12, next PLEX today 2/14. (plan for 5 sessions)  C/W ASA   Seen by neurology   Activity: [] mobilize as tolerated [] Bedrest [] PT [] OT [] PMNR    PULM:  Intubated for airway protection  16/500/5/40%- ABG reviewed, will decrease FiO2 to 30%  Patient may need early trach - continue discussion with family   C/w hypertonic nebs and chest PT   f/u CXR     CV:   SBP goal  with MAP >65   Titrate genny to goal - PICC c/s for pressors   Continue ASA   TTE- EF 66%  R- axillary A line     RENAL:  Fluids: IVL if tolerating diet  c/w Free water    GI:  Diet: TF AT goal  Vitamin D supplementation   GI prophylaxis [] not indicated [x] PPI [] other:  Bowel regimen [x] colace [x] senna, dulcolax suppository   LBM PTA     ENDO:   Goal euglycemia (-180)  A1c 6.8    HEME/ONC:  H/H stable   VTE prophylaxis: [x] SCDs [x] chemoprophylaxis- SQL [] hold chemoprophylaxis due to: [] high risk of DVT/PE on admission due to:    ID:  Afebrile, improved leukocytosis     MISC:  Lines/tubes: RIJ, R axillary daphne, vences     SOCIAL/FAMILY:  [] awaiting [x] updated at bedside [] family meeting    CODE STATUS:  [x] Full Code [] DNR [] DNI [] Palliative/Comfort Care    DISPOSITION:  [x] ICU [] Stroke Unit [] Floor [] EMU [] RCU [] PCU    [x] Patient is at high risk of neurologic deterioration/death due to: infection

## 2024-05-16 NOTE — ASU DISCHARGE PLAN (ADULT/PEDIATRIC) - CONDITION AT DISCHARGE
[de-identified] : 49 year old female  Treatment Area :  Consent taken and signed. Skin prepped and sterilized with alcohol wipe.  Test spot on neck waited 5 mins:  no erythema, no edema, pt tolerated well. Nd Yag Spot size: 10 Fluence: 17.0 Pulse duration 8.0 Frequency 1.0 Pt tolerated treatment well, skin asses no erthema or edema to treatment area.  No extreme heat, or increase body temperature .ie working out, sweating, sun exposure for 24 hours. Pt can return in 4-6 weeks for retreatment. Recommend a series of 6-8 treatments for optimal reduction of hair.  If swelling, blistering, peeling of skin occur pt is to contact office. Stable

## 2024-06-03 PROBLEM — I72.8 ANEURYSM OF OTHER SPECIFIED ARTERIES: Chronic | Status: ACTIVE | Noted: 2024-01-04

## 2024-06-03 PROBLEM — Z87.81 PERSONAL HISTORY OF (HEALED) TRAUMATIC FRACTURE: Chronic | Status: ACTIVE | Noted: 2024-01-04

## 2024-06-07 ENCOUNTER — APPOINTMENT (OUTPATIENT)
Dept: NEUROSURGERY | Facility: CLINIC | Age: 79
End: 2024-06-07
Payer: MEDICARE

## 2024-06-07 VITALS
DIASTOLIC BLOOD PRESSURE: 71 MMHG | WEIGHT: 168 LBS | HEIGHT: 60 IN | HEART RATE: 89 BPM | OXYGEN SATURATION: 100 % | BODY MASS INDEX: 32.98 KG/M2 | SYSTOLIC BLOOD PRESSURE: 118 MMHG

## 2024-06-07 DIAGNOSIS — M19.90 UNSPECIFIED OSTEOARTHRITIS, UNSPECIFIED SITE: ICD-10-CM

## 2024-06-07 PROCEDURE — 99203 OFFICE O/P NEW LOW 30 MIN: CPT

## 2024-06-07 NOTE — SOCIAL HISTORY
Chief Complaint   Patient presents with   • Toenail     np for right ingrown toenail.   • Diabetes   • Office Visit      Denies known Latex allergy or symptoms of Latex sensitivity.   Medications verified, no changes.   Pharmacy verified.   Tobacco use verified.     [No] : No [FreeTextEntry1] : Retired

## 2024-06-07 NOTE — CONSULT LETTER
[Dear  ___] : Dear  [unfilled], [Courtesy Letter:] : I had the pleasure of seeing your patient, [unfilled], in my office today. [Sincerely,] : Sincerely, [FreeTextEntry2] : Neri Murphy MD [address] [place], [place] [id number] [FreeTextEntry1] : This is a very pleasant 78-year-old retired female who presents to our office for a first neurosurgical consultation.  She reports a longstanding history of lower back pain that began when she was in her 20s and has fluctuated over the years.  The patient has had no injury that preceded the symptoms.  However, in the last year the pain has gotten progressively worse.  She is no longer able to stand for more than 10 minutes.  She cannot walk long distances either.  The pain is primarily in the lower back across the entire back.  There is no leg involvement.  No tingling and numbness.  However, there is radiating right hip pain.  The pain begins in the lower back radiates to the right hip and at times radiates down into the right leg.  She requires assistance to walk distances.  She does believe her hip is weak from the pain.  The patient denies any trips or falls.  There is no bowel or bladder dysfunction.  No balance issues.  She has a history of bladder cancer in the past.  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      Years prior the patient had been on Advil which was effective.  In the most recent past the patient has been placed on Brilinta and Advil was stopped.  Years prior the patient has gone to physical therapy, chiropractic therapy, and received multiple lumbar epidural steroid injections.  All of these nonconservative treatments have not been beneficial.  She does take daily Tylenol, with little effect.  The patient reports a prior history of scoliosis.  The patient has no updated imaging of her lumbar spine.  She does bring to the office and MRI of the lumbar spine from [facility] from 2007.  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  There is no cord compression noted.  On neurologic examination, the patient is alert and oriented.  Appears well and in no distress.  Speech clear and fluent.  No vibration, temperature, or sensory loss throughout and pin prick normal.  The patient is pitched forward with asymmetry of her right shoulder.  Full strength in all muscle groups.   There is no right ankle jerk, otherwise reflexes are 2+ in all extremities and equal and symmetric throughout.  No abnormal extraneous movements.  No clonus.  Gait is unsteady, antalgic.   Unable to perform tandem and unable to walk on heels and toes without difficulty.    This patient suffers from lower back pain and right hip pain.  It is possible the patient has lumbar stenosis with neurogenic claudication.  I have ordered a lumbar MRI to assess for advanced DJD.  A lumbar flexion-extension x-ray was ordered as well to rule out any dynamic instability.  Scoliosis series will be obtained as well to assess for pelvic tilt or sagittal imbalance.  A hip MRI was ordered to rule out any hip etiology.  The patient may have a complex presentation with both hip and lumbar OA.  The patient is now currently off Brilinta and she was instructed to try Advil on a as needed basis.  We discussed Advil side effects and instructions for use.  The patient has a good understanding.  Once the MRI and xrays are complete the patient will return to the office and see Dr. Rivero in follow-up.  The patient will call for any concerns.  Thank you for very kindly including me in the evaluation and treatment of your patient.  Please do not hesitate to contact me should you have any concerns or questions regarding the patients symptom profile or proposed follow-up treatment and evaluation plan.   [FreeTextEntry3] : Kimberly Lombardo, DNP, NP Nurse Practitioner Neurosurgery and Spine [facility] [place] [address] Assistant  [employer] email: [email or web address] P- [phone] F- [phone]

## 2024-06-07 NOTE — REVIEW OF SYSTEMS
[Leg Weakness] : leg weakness [Difficulty Walking] : difficulty walking [Negative] : Heme/Lymph [Numbness] : no numbness [Tingling] : no tingling [de-identified] : back and right hip pain

## 2024-07-11 ENCOUNTER — APPOINTMENT (OUTPATIENT)
Dept: NEUROSURGERY | Facility: CLINIC | Age: 79
End: 2024-07-11

## 2024-07-11 VITALS
SYSTOLIC BLOOD PRESSURE: 136 MMHG | DIASTOLIC BLOOD PRESSURE: 71 MMHG | OXYGEN SATURATION: 97 % | WEIGHT: 170 LBS | HEART RATE: 73 BPM | HEIGHT: 61 IN | BODY MASS INDEX: 32.1 KG/M2

## 2024-07-11 PROCEDURE — 99214 OFFICE O/P EST MOD 30 MIN: CPT

## 2024-09-12 ENCOUNTER — APPOINTMENT (OUTPATIENT)
Dept: NEUROSURGERY | Facility: CLINIC | Age: 79
End: 2024-09-12

## 2024-09-12 VITALS
HEIGHT: 61 IN | OXYGEN SATURATION: 100 % | DIASTOLIC BLOOD PRESSURE: 66 MMHG | HEART RATE: 75 BPM | SYSTOLIC BLOOD PRESSURE: 100 MMHG | BODY MASS INDEX: 32.1 KG/M2 | WEIGHT: 170 LBS

## 2024-09-12 PROCEDURE — 99214 OFFICE O/P EST MOD 30 MIN: CPT

## 2024-09-12 NOTE — CONSULT LETTER
[Dear  ___] : Dear  [unfilled], [Courtesy Letter:] : I had the pleasure of seeing your patient, [unfilled], in my office today. [Sincerely,] : Sincerely, [FreeTextEntry2] : Neri Murphy MD [address] [place], [place] [id number]  [FreeTextEntry3] : Rick Rivero MD, PhD, FRCPSC   Attending Neurosurgeon   of Neurosurgery  [facility]  [address] [facility], [address]nd [place] [address]  Office: [phone]  Fax: [phone]

## 2024-10-07 PROBLEM — M43.16 SPONDYLOLISTHESIS OF LUMBAR REGION: Status: ACTIVE | Noted: 2024-10-07

## 2024-10-07 PROBLEM — M41.50 SCOLIOSIS DUE TO DEGENERATIVE DISEASE OF SPINE IN ADULT PATIENT: Status: ACTIVE | Noted: 2024-10-07

## 2024-10-07 PROBLEM — M48.062 LUMBAR STENOSIS WITH NEUROGENIC CLAUDICATION: Status: ACTIVE | Noted: 2024-10-07

## 2024-10-07 PROBLEM — M47.816 LUMBAR SPONDYLOSIS: Status: ACTIVE | Noted: 2024-10-07

## 2024-10-24 ENCOUNTER — OFFICE (OUTPATIENT)
Dept: URBAN - METROPOLITAN AREA CLINIC 109 | Facility: CLINIC | Age: 79
Setting detail: OPHTHALMOLOGY
End: 2024-10-24
Payer: MEDICARE

## 2024-10-24 DIAGNOSIS — H25.13: ICD-10-CM

## 2024-10-24 DIAGNOSIS — S02.31XD: ICD-10-CM

## 2024-10-24 PROCEDURE — 99204 OFFICE O/P NEW MOD 45 MIN: CPT | Performed by: OPHTHALMOLOGY

## 2024-10-24 ASSESSMENT — REFRACTION_AUTOREFRACTION
OD_AXIS: 088
OD_SPHERE: +0.25
OS_AXIS: 086
OS_SPHERE: -0.25
OS_CYLINDER: -3.50
OD_CYLINDER: -3.75

## 2024-10-24 ASSESSMENT — REFRACTION_CURRENTRX
OS_CYLINDER: -3.00
OD_SPHERE: +0.75
OS_CYLINDER: -3.50
OS_OVR_VA: 20/
OS_AXIS: 091
OD_VPRISM_DIRECTION: SV
OD_OVR_VA: 20/
OS_VPRISM_DIRECTION: SV
OD_AXIS: 088
OD_AXIS: 091
OS_OVR_VA: 20/
OD_SPHERE: +3.25
OS_AXIS: 083
OS_SPHERE: +3.00
OS_SPHERE: +0.50
OD_OVR_VA: 20/
OD_CYLINDER: -3.00
OD_CYLINDER: -3.25

## 2024-10-24 ASSESSMENT — VISUAL ACUITY
OS_BCVA: 20/30-2
OD_BCVA: 20/60

## 2024-10-24 ASSESSMENT — CONFRONTATIONAL VISUAL FIELD TEST (CVF)
OD_FINDINGS: FULL
OS_FINDINGS: FULL

## 2024-10-24 ASSESSMENT — KERATOMETRY
OD_AXISANGLE_DEGREES: 011
OS_AXISANGLE_DEGREES: 169
OS_K1POWER_DIOPTERS: 43.75
OD_K1POWER_DIOPTERS: 44.00
OD_K2POWER_DIOPTERS: 46.25
OS_K2POWER_DIOPTERS: 45.75

## 2024-10-24 ASSESSMENT — TONOMETRY
OD_IOP_MMHG: 16
OS_IOP_MMHG: 19

## 2024-12-30 ENCOUNTER — OFFICE (OUTPATIENT)
Dept: URBAN - METROPOLITAN AREA CLINIC 109 | Facility: CLINIC | Age: 79
Setting detail: OPHTHALMOLOGY
End: 2024-12-30
Payer: MEDICARE

## 2024-12-30 DIAGNOSIS — H25.13: ICD-10-CM

## 2024-12-30 DIAGNOSIS — H25.11: ICD-10-CM

## 2024-12-30 PROCEDURE — 92136 OPHTHALMIC BIOMETRY: CPT | Mod: 26,RT | Performed by: OPHTHALMOLOGY

## 2024-12-30 PROCEDURE — 92136 OPHTHALMIC BIOMETRY: CPT | Mod: TC | Performed by: OPHTHALMOLOGY

## 2024-12-30 PROCEDURE — 99213 OFFICE O/P EST LOW 20 MIN: CPT | Performed by: OPHTHALMOLOGY

## 2024-12-30 ASSESSMENT — KERATOMETRY
OD_AXISANGLE2_DEGREES: 13
OD_K1K2_AVERAGE: 45.25
OS_AXISANGLE_DEGREES: 177
OS_CYLPOWER_DEGREES: 2
OS_CYLAXISANGLE_DEGREES: 177
OS_K1K2_AVERAGE: 45
OD_K2POWER_DIOPTERS: 46.50
OS_K1POWER_DIOPTERS: 44.00
OD_K1POWER_DIOPTERS: 44.00
OS_K2POWER_DIOPTERS: 46.00
OD_AXISANGLE_DEGREES: 13
OD_K2POWER_DIOPTERS: 46.50
OD_CYLPOWER_DEGREES: 2.5
OS_K1POWER_DIOPTERS: 44.00
OD_AXISANGLE_DEGREES: 13
OS_AXISANGLE_DEGREES: 177
OS_AXISANGLE2_DEGREES: 177
OD_K1POWER_DIOPTERS: 44.00
OD_CYLAXISANGLE_DEGREES: 13
OS_K2POWER_DIOPTERS: 46.00

## 2024-12-30 ASSESSMENT — REFRACTION_CURRENTRX
OS_VPRISM_DIRECTION: SV
OD_CYLINDER: -3.25
OD_VPRISM_DIRECTION: SV
OS_SPHERE: +0.50
OD_SPHERE: +0.75
OD_CYLINDER: -3.00
OS_CYLINDER: -3.50
OS_CYLINDER: -3.00
OS_OVR_VA: 20/
OS_SPHERE: +3.00
OS_AXIS: 083
OD_SPHERE: +3.25
OS_AXIS: 091
OS_OVR_VA: 20/
OD_OVR_VA: 20/
OD_AXIS: 091
OD_OVR_VA: 20/
OD_AXIS: 088

## 2024-12-30 ASSESSMENT — CONFRONTATIONAL VISUAL FIELD TEST (CVF)
OS_FINDINGS: FULL
OD_FINDINGS: FULL

## 2024-12-30 ASSESSMENT — TONOMETRY
OS_IOP_MMHG: 18
OD_IOP_MMHG: 16

## 2024-12-30 ASSESSMENT — REFRACTION_AUTOREFRACTION
OD_AXIS: 094
OS_AXIS: 091
OS_CYLINDER: -3.50
OD_SPHERE: +0.25
OD_CYLINDER: -3.75
OS_SPHERE: -0.75

## 2024-12-30 ASSESSMENT — VISUAL ACUITY
OS_BCVA: 20/30-2
OD_BCVA: 20/60

## 2025-01-21 ENCOUNTER — AMBULATORY SURGERY CENTER (OUTPATIENT)
Dept: URBAN - METROPOLITAN AREA SURGERY 19 | Facility: SURGERY | Age: 80
Setting detail: OPHTHALMOLOGY
End: 2025-01-21
Payer: MEDICARE

## 2025-01-21 DIAGNOSIS — H25.11: ICD-10-CM

## 2025-01-21 DIAGNOSIS — H52.221: ICD-10-CM

## 2025-01-21 PROCEDURE — S9986 NOT MEDICALLY NECESSARY SVC: HCPCS | Mod: GX,GY | Performed by: OPHTHALMOLOGY

## 2025-01-21 PROCEDURE — V2787 ASTIGMATISM-CORRECT FUNCTION: HCPCS | Performed by: OPHTHALMOLOGY

## 2025-01-21 PROCEDURE — 66984 XCAPSL CTRC RMVL W/O ECP: CPT | Mod: RT | Performed by: OPHTHALMOLOGY

## 2025-01-22 ENCOUNTER — OFFICE (OUTPATIENT)
Dept: URBAN - METROPOLITAN AREA CLINIC 109 | Facility: CLINIC | Age: 80
Setting detail: OPHTHALMOLOGY
End: 2025-01-22
Payer: MEDICARE

## 2025-01-22 ENCOUNTER — RX ONLY (RX ONLY)
Age: 80
End: 2025-01-22

## 2025-01-22 DIAGNOSIS — H25.12: ICD-10-CM

## 2025-01-22 DIAGNOSIS — Z96.1: ICD-10-CM

## 2025-01-22 PROCEDURE — 99024 POSTOP FOLLOW-UP VISIT: CPT | Performed by: OPTOMETRIST

## 2025-01-22 ASSESSMENT — REFRACTION_CURRENTRX
OS_CYLINDER: -3.00
OD_SPHERE: +3.25
OD_SPHERE: +0.75
OS_SPHERE: +3.00
OD_AXIS: 091
OS_CYLINDER: -3.50
OS_VPRISM_DIRECTION: SV
OS_AXIS: 083
OD_AXIS: 088
OD_CYLINDER: -3.00
OS_OVR_VA: 20/
OD_OVR_VA: 20/
OD_CYLINDER: -3.25
OS_SPHERE: +0.50
OD_OVR_VA: 20/
OS_AXIS: 091
OD_VPRISM_DIRECTION: SV
OS_OVR_VA: 20/

## 2025-01-22 ASSESSMENT — KERATOMETRY
OD_AXISANGLE_DEGREES: 13
OD_K2POWER_DIOPTERS: 46.50
OS_K1POWER_DIOPTERS: 44.00
OS_AXISANGLE_DEGREES: 177
OD_K1POWER_DIOPTERS: 44.00
OS_K2POWER_DIOPTERS: 46.00

## 2025-01-22 ASSESSMENT — REFRACTION_AUTOREFRACTION
OD_CYLINDER: -3.75
OD_AXIS: 094
OS_SPHERE: -0.75
OD_SPHERE: +0.25
OS_CYLINDER: -3.50
OS_AXIS: 091

## 2025-01-22 ASSESSMENT — VISUAL ACUITY
OD_BCVA: 20/60
OS_BCVA: 20/30

## 2025-01-22 ASSESSMENT — CONFRONTATIONAL VISUAL FIELD TEST (CVF)
OD_FINDINGS: FULL
OS_FINDINGS: FULL

## 2025-01-22 ASSESSMENT — TONOMETRY: OD_IOP_MMHG: 19

## 2025-01-29 ENCOUNTER — RX ONLY (RX ONLY)
Age: 80
End: 2025-01-29

## 2025-01-29 ENCOUNTER — OFFICE (OUTPATIENT)
Dept: URBAN - METROPOLITAN AREA CLINIC 109 | Facility: CLINIC | Age: 80
Setting detail: OPHTHALMOLOGY
End: 2025-01-29
Payer: MEDICARE

## 2025-01-29 DIAGNOSIS — H25.12: ICD-10-CM

## 2025-01-29 DIAGNOSIS — Z96.1: ICD-10-CM

## 2025-01-29 PROCEDURE — 99024 POSTOP FOLLOW-UP VISIT: CPT | Performed by: OPHTHALMOLOGY

## 2025-01-29 PROCEDURE — 92136 OPHTHALMIC BIOMETRY: CPT | Mod: 26,LT | Performed by: OPHTHALMOLOGY

## 2025-01-29 ASSESSMENT — REFRACTION_CURRENTRX
OS_VPRISM_DIRECTION: SV
OS_AXIS: 083
OS_SPHERE: +3.00
OD_VPRISM_DIRECTION: SV
OS_CYLINDER: -3.00
OD_AXIS: 091
OS_OVR_VA: 20/
OS_SPHERE: +0.50
OD_CYLINDER: -3.00
OS_AXIS: 091
OD_CYLINDER: -3.25
OD_AXIS: 088
OD_OVR_VA: 20/
OS_CYLINDER: -3.50
OD_SPHERE: +3.25
OD_SPHERE: +0.75
OD_OVR_VA: 20/
OS_OVR_VA: 20/

## 2025-01-29 ASSESSMENT — CONFRONTATIONAL VISUAL FIELD TEST (CVF)
OD_FINDINGS: FULL
OS_FINDINGS: FULL

## 2025-01-29 ASSESSMENT — REFRACTION_AUTOREFRACTION
OD_CYLINDER: -0.50
OS_AXIS: 92
OD_AXIS: 68
OS_SPHERE: -0.50
OS_CYLINDER: -3.75
OD_SPHERE: +0.50

## 2025-01-29 ASSESSMENT — KERATOMETRY
OD_K1POWER_DIOPTERS: 44.50
OD_AXISANGLE_DEGREES: 16
OD_K2POWER_DIOPTERS: 46.25
OS_K1POWER_DIOPTERS: 43.75
OS_AXISANGLE_DEGREES: 175
OS_K2POWER_DIOPTERS: 46.00

## 2025-01-29 ASSESSMENT — TONOMETRY
OD_IOP_MMHG: 16
OS_IOP_MMHG: 16

## 2025-01-29 ASSESSMENT — VISUAL ACUITY
OS_BCVA: 20/25-2
OD_BCVA: 20/60

## 2025-02-04 ENCOUNTER — AMBULATORY SURGERY CENTER (OUTPATIENT)
Dept: URBAN - METROPOLITAN AREA SURGERY 19 | Facility: SURGERY | Age: 80
Setting detail: OPHTHALMOLOGY
End: 2025-02-04
Payer: MEDICARE

## 2025-02-04 DIAGNOSIS — H52.222: ICD-10-CM

## 2025-02-04 DIAGNOSIS — H25.12: ICD-10-CM

## 2025-02-04 PROCEDURE — FEMTO PRECISION LASER CATARACT SURGERY: Mod: GY | Performed by: OPHTHALMOLOGY

## 2025-02-04 PROCEDURE — S9986 NOT MEDICALLY NECESSARY SVC: HCPCS | Mod: GX,GY | Performed by: OPHTHALMOLOGY

## 2025-02-04 PROCEDURE — V2787 ASTIGMATISM-CORRECT FUNCTION: HCPCS | Performed by: OPHTHALMOLOGY

## 2025-02-04 PROCEDURE — 66984 XCAPSL CTRC RMVL W/O ECP: CPT | Mod: 79,LT | Performed by: OPHTHALMOLOGY

## 2025-02-05 ENCOUNTER — OFFICE (OUTPATIENT)
Dept: URBAN - METROPOLITAN AREA CLINIC 109 | Facility: CLINIC | Age: 80
Setting detail: OPHTHALMOLOGY
End: 2025-02-05
Payer: MEDICARE

## 2025-02-05 DIAGNOSIS — Z96.1: ICD-10-CM

## 2025-02-05 PROCEDURE — 99024 POSTOP FOLLOW-UP VISIT: CPT | Performed by: OPTOMETRIST

## 2025-02-05 ASSESSMENT — REFRACTION_AUTOREFRACTION
OS_CYLINDER: -3.75
OD_SPHERE: +0.50
OS_SPHERE: -0.50
OD_AXIS: 68
OD_CYLINDER: -0.50
OS_AXIS: 92

## 2025-02-05 ASSESSMENT — REFRACTION_CURRENTRX
OD_AXIS: 088
OD_VPRISM_DIRECTION: SV
OD_SPHERE: +3.25
OS_OVR_VA: 20/
OD_CYLINDER: -3.00
OS_SPHERE: +3.00
OS_AXIS: 083
OD_CYLINDER: -3.25
OS_CYLINDER: -3.00
OS_SPHERE: +0.50
OD_AXIS: 091
OS_CYLINDER: -3.50
OS_OVR_VA: 20/
OS_AXIS: 091
OS_VPRISM_DIRECTION: SV
OD_OVR_VA: 20/
OD_SPHERE: +0.75
OD_OVR_VA: 20/

## 2025-02-05 ASSESSMENT — TONOMETRY
OD_IOP_MMHG: 16
OS_IOP_MMHG: 19

## 2025-02-05 ASSESSMENT — KERATOMETRY
OD_K1POWER_DIOPTERS: 44.50
OS_K1POWER_DIOPTERS: 43.75
OD_K2POWER_DIOPTERS: 46.25
OS_AXISANGLE_DEGREES: 175
OS_K2POWER_DIOPTERS: 46.00
OD_AXISANGLE_DEGREES: 16

## 2025-02-05 ASSESSMENT — CONFRONTATIONAL VISUAL FIELD TEST (CVF)
OS_FINDINGS: FULL
OD_FINDINGS: FULL

## 2025-02-05 ASSESSMENT — VISUAL ACUITY
OD_BCVA: 20/25-2
OS_BCVA: 20/25-2

## 2025-02-25 ENCOUNTER — OFFICE (OUTPATIENT)
Dept: URBAN - METROPOLITAN AREA CLINIC 109 | Facility: CLINIC | Age: 80
Setting detail: OPHTHALMOLOGY
End: 2025-02-25
Payer: MEDICARE

## 2025-02-25 DIAGNOSIS — Z96.1: ICD-10-CM

## 2025-02-25 PROCEDURE — 99024 POSTOP FOLLOW-UP VISIT: CPT | Performed by: OPTOMETRIST

## 2025-02-25 ASSESSMENT — TONOMETRY
OD_IOP_MMHG: 17
OS_IOP_MMHG: 19

## 2025-02-25 ASSESSMENT — CONFRONTATIONAL VISUAL FIELD TEST (CVF)
OD_FINDINGS: FULL
OS_FINDINGS: FULL

## 2025-02-26 ASSESSMENT — REFRACTION_CURRENTRX
OS_OVR_VA: 20/
OD_VPRISM_DIRECTION: SV
OD_AXIS: 088
OD_OVR_VA: 20/
OD_SPHERE: +0.75
OD_CYLINDER: -3.00
OD_SPHERE: +3.25
OS_SPHERE: +3.00
OD_AXIS: 091
OS_CYLINDER: -3.00
OS_CYLINDER: -3.50
OS_AXIS: 091
OD_CYLINDER: -3.25
OS_OVR_VA: 20/
OS_VPRISM_DIRECTION: SV
OS_AXIS: 083
OD_OVR_VA: 20/
OS_SPHERE: +0.50

## 2025-02-26 ASSESSMENT — REFRACTION_AUTOREFRACTION
OD_CYLINDER: -0.75
OS_CYLINDER: -1.00
OD_AXIS: 072
OD_SPHERE: +0.50
OS_SPHERE: +1.00
OS_AXIS: 079

## 2025-02-26 ASSESSMENT — KERATOMETRY
OD_K1POWER_DIOPTERS: 44.25
OD_K2POWER_DIOPTERS: 46.25
OS_K1POWER_DIOPTERS: 43.75
OS_K2POWER_DIOPTERS: 46.00
OS_AXISANGLE_DEGREES: 176
OD_AXISANGLE_DEGREES: 012

## 2025-02-26 ASSESSMENT — VISUAL ACUITY
OS_BCVA: 20/30
OD_BCVA: 20/30-1

## 2025-03-04 ENCOUNTER — APPOINTMENT (OUTPATIENT)
Dept: CARDIOTHORACIC SURGERY | Facility: CLINIC | Age: 80
End: 2025-03-04

## 2025-03-04 ENCOUNTER — APPOINTMENT (OUTPATIENT)
Dept: CARDIOTHORACIC SURGERY | Facility: CLINIC | Age: 80
End: 2025-03-04
Payer: MEDICARE

## 2025-03-04 ENCOUNTER — NON-APPOINTMENT (OUTPATIENT)
Age: 80
End: 2025-03-04

## 2025-03-04 VITALS
WEIGHT: 170.99 LBS | OXYGEN SATURATION: 97 % | RESPIRATION RATE: 16 BRPM | BODY MASS INDEX: 33.57 KG/M2 | SYSTOLIC BLOOD PRESSURE: 140 MMHG | HEART RATE: 63 BPM | DIASTOLIC BLOOD PRESSURE: 88 MMHG | HEIGHT: 60 IN

## 2025-03-04 VITALS
SYSTOLIC BLOOD PRESSURE: 140 MMHG | BODY MASS INDEX: 33.38 KG/M2 | WEIGHT: 170 LBS | RESPIRATION RATE: 16 BRPM | HEIGHT: 60 IN | HEART RATE: 63 BPM | OXYGEN SATURATION: 97 % | DIASTOLIC BLOOD PRESSURE: 88 MMHG

## 2025-03-04 DIAGNOSIS — I73.9 PERIPHERAL VASCULAR DISEASE, UNSPECIFIED: ICD-10-CM

## 2025-03-04 DIAGNOSIS — I50.30 UNSPECIFIED DIASTOLIC (CONGESTIVE) HEART FAILURE: ICD-10-CM

## 2025-03-04 DIAGNOSIS — I35.0 NONRHEUMATIC AORTIC (VALVE) STENOSIS: ICD-10-CM

## 2025-03-04 DIAGNOSIS — I25.9 CHRONIC ISCHEMIC HEART DISEASE, UNSPECIFIED: ICD-10-CM

## 2025-03-04 DIAGNOSIS — Z87.891 PERSONAL HISTORY OF NICOTINE DEPENDENCE: ICD-10-CM

## 2025-03-04 DIAGNOSIS — I10 ESSENTIAL (PRIMARY) HYPERTENSION: ICD-10-CM

## 2025-03-04 DIAGNOSIS — R93.1 ABNORMAL FINDINGS ON DIAGNOSTIC IMAGING OF HEART AND CORONARY CIRCULATION: ICD-10-CM

## 2025-03-04 PROCEDURE — 99205 OFFICE O/P NEW HI 60 MIN: CPT

## 2025-03-04 PROCEDURE — ZZZZZ: CPT

## 2025-03-04 PROCEDURE — 99203 OFFICE O/P NEW LOW 30 MIN: CPT

## 2025-03-04 RX ORDER — ENALAPRIL MALEATE 20 MG/1
20 TABLET ORAL TWICE DAILY
Refills: 0 | Status: ACTIVE | COMMUNITY
Start: 2025-03-04

## 2025-03-04 RX ORDER — EZETIMIBE 10 MG/1
10 TABLET ORAL
Refills: 0 | Status: ACTIVE | COMMUNITY
Start: 2025-03-04

## 2025-03-04 RX ORDER — ASPIRIN ENTERIC COATED TABLETS 81 MG 81 MG/1
81 TABLET, DELAYED RELEASE ORAL DAILY
Refills: 0 | Status: ACTIVE | COMMUNITY
Start: 2025-03-04

## 2025-03-04 RX ORDER — FUROSEMIDE 20 MG/1
20 TABLET ORAL DAILY
Qty: 14 | Refills: 1 | Status: ACTIVE | COMMUNITY
Start: 2025-03-04

## 2025-03-04 RX ORDER — METOPROLOL SUCCINATE 25 MG/1
25 TABLET, EXTENDED RELEASE ORAL DAILY
Qty: 30 | Refills: 1 | Status: ACTIVE | COMMUNITY
Start: 2025-03-04

## 2025-03-08 PROBLEM — I25.9 CHRONIC ISCHEMIC HEART DISEASE: Status: ACTIVE | Noted: 2025-03-08

## 2025-03-08 PROBLEM — I73.9 PERIPHERAL VASCULAR DISEASE OF LOWER EXTREMITY: Status: ACTIVE | Noted: 2025-03-08

## 2025-03-08 PROBLEM — I50.30 DIASTOLIC CONGESTIVE HEART FAILURE, UNSPECIFIED HF CHRONICITY: Status: ACTIVE | Noted: 2025-03-08

## 2025-03-08 PROBLEM — I35.0 AORTIC STENOSIS: Status: ACTIVE | Noted: 2025-03-08

## 2025-03-08 PROBLEM — I10 HYPERTENSION: Status: ACTIVE | Noted: 2025-03-08

## 2025-03-08 PROBLEM — R93.1 ABNORMAL ECHOCARDIOGRAM: Status: ACTIVE | Noted: 2025-03-08

## 2025-05-12 ENCOUNTER — OFFICE (OUTPATIENT)
Dept: URBAN - METROPOLITAN AREA CLINIC 109 | Facility: CLINIC | Age: 80
Setting detail: OPHTHALMOLOGY
End: 2025-05-12
Payer: MEDICARE

## 2025-05-12 DIAGNOSIS — H10.45: ICD-10-CM

## 2025-05-12 DIAGNOSIS — H16.223: ICD-10-CM

## 2025-05-12 PROCEDURE — 99213 OFFICE O/P EST LOW 20 MIN: CPT | Performed by: OPHTHALMOLOGY

## 2025-05-12 ASSESSMENT — REFRACTION_CURRENTRX
OD_CYLINDER: -3.00
OD_VPRISM_DIRECTION: SV
OS_OVR_VA: 20/
OD_CYLINDER: -3.25
OS_AXIS: 083
OS_CYLINDER: -3.50
OS_OVR_VA: 20/
OD_AXIS: 088
OS_AXIS: 091
OD_OVR_VA: 20/
OS_SPHERE: +0.50
OD_OVR_VA: 20/
OD_SPHERE: +0.75
OD_SPHERE: +3.25
OD_AXIS: 091
OS_CYLINDER: -3.00
OS_SPHERE: +3.00
OS_VPRISM_DIRECTION: SV

## 2025-05-12 ASSESSMENT — KERATOMETRY
OD_AXISANGLE_DEGREES: 012
OS_K2POWER_DIOPTERS: 46.00
OS_AXISANGLE_DEGREES: 176
OD_K1POWER_DIOPTERS: 44.25
OD_K2POWER_DIOPTERS: 46.25
OS_K1POWER_DIOPTERS: 43.75

## 2025-05-12 ASSESSMENT — REFRACTION_AUTOREFRACTION
OD_AXIS: 072
OS_CYLINDER: -1.00
OS_SPHERE: +1.00
OD_SPHERE: +0.50
OS_AXIS: 079
OD_CYLINDER: -0.75

## 2025-05-12 ASSESSMENT — CONFRONTATIONAL VISUAL FIELD TEST (CVF)
OS_FINDINGS: FULL
OD_FINDINGS: FULL

## 2025-05-12 ASSESSMENT — VISUAL ACUITY
OD_BCVA: 20/25-1
OS_BCVA: 20/25+2

## 2025-05-12 ASSESSMENT — SUPERFICIAL PUNCTATE KERATITIS (SPK)
OS_SPK: T
OD_SPK: T

## 2025-06-24 ENCOUNTER — RX ONLY (RX ONLY)
Age: 80
End: 2025-06-24

## 2025-06-24 ENCOUNTER — OFFICE (OUTPATIENT)
Dept: URBAN - METROPOLITAN AREA CLINIC 109 | Facility: CLINIC | Age: 80
Setting detail: OPHTHALMOLOGY
End: 2025-06-24
Payer: MEDICARE

## 2025-06-24 DIAGNOSIS — Z96.1: ICD-10-CM

## 2025-06-24 DIAGNOSIS — H16.223: ICD-10-CM

## 2025-06-24 DIAGNOSIS — H10.45: ICD-10-CM

## 2025-06-24 DIAGNOSIS — H26.493: ICD-10-CM

## 2025-06-24 DIAGNOSIS — S02.31XD: ICD-10-CM

## 2025-06-24 DIAGNOSIS — H35.40: ICD-10-CM

## 2025-06-24 DIAGNOSIS — H43.813: ICD-10-CM

## 2025-06-24 PROCEDURE — 92250 FUNDUS PHOTOGRAPHY W/I&R: CPT | Performed by: OPTOMETRIST

## 2025-06-24 PROCEDURE — 99213 OFFICE O/P EST LOW 20 MIN: CPT | Performed by: OPTOMETRIST

## 2025-06-24 ASSESSMENT — REFRACTION_CURRENTRX
OD_SPHERE: +3.25
OS_OVR_VA: 20/
OS_SPHERE: +0.50
OS_AXIS: 091
OD_AXIS: 091
OS_CYLINDER: -3.50
OD_AXIS: 088
OD_SPHERE: +0.75
OS_CYLINDER: -3.00
OS_SPHERE: +3.00
OS_OVR_VA: 20/
OD_VPRISM_DIRECTION: SV
OD_OVR_VA: 20/
OS_VPRISM_DIRECTION: SV
OD_CYLINDER: -3.25
OD_CYLINDER: -3.00
OD_OVR_VA: 20/
OS_AXIS: 083

## 2025-06-24 ASSESSMENT — KERATOMETRY
OS_K2POWER_DIOPTERS: 46.00
OD_K1POWER_DIOPTERS: 44.25
OS_AXISANGLE_DEGREES: 176
OD_AXISANGLE_DEGREES: 012
OD_K2POWER_DIOPTERS: 46.25
OS_K1POWER_DIOPTERS: 43.75

## 2025-06-24 ASSESSMENT — TONOMETRY
OD_IOP_MMHG: 14
OS_IOP_MMHG: 17

## 2025-06-24 ASSESSMENT — CONFRONTATIONAL VISUAL FIELD TEST (CVF)
OD_FINDINGS: FULL
OS_FINDINGS: FULL

## 2025-06-24 ASSESSMENT — REFRACTION_AUTOREFRACTION
OS_SPHERE: +0.75
OD_CYLINDER: -1.25
OD_SPHERE: +0.75
OS_CYLINDER: -1.00
OD_AXIS: 089
OS_AXIS: 078

## 2025-06-24 ASSESSMENT — SUPERFICIAL PUNCTATE KERATITIS (SPK)
OD_SPK: T
OS_SPK: T

## 2025-06-24 ASSESSMENT — VISUAL ACUITY
OS_BCVA: 20/25-2
OD_BCVA: 20/25-1

## 2025-07-09 ENCOUNTER — OUTPATIENT (OUTPATIENT)
Dept: OUTPATIENT SERVICES | Facility: HOSPITAL | Age: 80
LOS: 1 days | End: 2025-07-09
Payer: MEDICARE

## 2025-07-09 DIAGNOSIS — C67.9 MALIGNANT NEOPLASM OF BLADDER, UNSPECIFIED: Chronic | ICD-10-CM

## 2025-07-09 DIAGNOSIS — Z98.890 OTHER SPECIFIED POSTPROCEDURAL STATES: Chronic | ICD-10-CM

## 2025-07-09 DIAGNOSIS — Z90.49 ACQUIRED ABSENCE OF OTHER SPECIFIED PARTS OF DIGESTIVE TRACT: Chronic | ICD-10-CM

## 2025-07-09 DIAGNOSIS — I35.0 NONRHEUMATIC AORTIC (VALVE) STENOSIS: ICD-10-CM

## 2025-07-10 ENCOUNTER — RESULT REVIEW (OUTPATIENT)
Age: 80
End: 2025-07-10

## 2025-07-10 ENCOUNTER — APPOINTMENT (OUTPATIENT)
Dept: CARDIOTHORACIC SURGERY | Facility: CLINIC | Age: 80
End: 2025-07-10
Payer: MEDICARE

## 2025-07-10 VITALS
HEIGHT: 60 IN | BODY MASS INDEX: 33.38 KG/M2 | WEIGHT: 170 LBS | RESPIRATION RATE: 18 BRPM | SYSTOLIC BLOOD PRESSURE: 128 MMHG | HEART RATE: 60 BPM | DIASTOLIC BLOOD PRESSURE: 67 MMHG | OXYGEN SATURATION: 99 %

## 2025-07-10 PROCEDURE — 93306 TTE W/DOPPLER COMPLETE: CPT

## 2025-07-10 PROCEDURE — 99213 OFFICE O/P EST LOW 20 MIN: CPT

## 2025-07-10 PROCEDURE — 93306 TTE W/DOPPLER COMPLETE: CPT | Mod: 26

## 2025-07-10 PROCEDURE — 93356 MYOCRD STRAIN IMG SPCKL TRCK: CPT
